# Patient Record
Sex: MALE | Race: WHITE | NOT HISPANIC OR LATINO | Employment: STUDENT | URBAN - METROPOLITAN AREA
[De-identification: names, ages, dates, MRNs, and addresses within clinical notes are randomized per-mention and may not be internally consistent; named-entity substitution may affect disease eponyms.]

---

## 2017-06-22 ENCOUNTER — ALLSCRIPTS OFFICE VISIT (OUTPATIENT)
Dept: OTHER | Facility: OTHER | Age: 22
End: 2017-06-22

## 2017-06-22 DIAGNOSIS — F41.9 ANXIETY DISORDER: ICD-10-CM

## 2017-07-25 ENCOUNTER — ALLSCRIPTS OFFICE VISIT (OUTPATIENT)
Dept: OTHER | Facility: OTHER | Age: 22
End: 2017-07-25

## 2017-08-03 ENCOUNTER — TRANSCRIBE ORDERS (OUTPATIENT)
Dept: ADMINISTRATIVE | Facility: HOSPITAL | Age: 22
End: 2017-08-03

## 2017-08-03 DIAGNOSIS — R51.9 CHRONIC NONINTRACTABLE HEADACHE, UNSPECIFIED HEADACHE TYPE: Primary | ICD-10-CM

## 2017-08-03 DIAGNOSIS — G89.29 CHRONIC NONINTRACTABLE HEADACHE, UNSPECIFIED HEADACHE TYPE: Primary | ICD-10-CM

## 2017-11-21 ENCOUNTER — GENERIC CONVERSION - ENCOUNTER (OUTPATIENT)
Dept: OTHER | Facility: OTHER | Age: 22
End: 2017-11-21

## 2017-12-21 ENCOUNTER — ALLSCRIPTS OFFICE VISIT (OUTPATIENT)
Dept: OTHER | Facility: OTHER | Age: 22
End: 2017-12-21

## 2017-12-21 DIAGNOSIS — N45.1 EPIDIDYMITIS: ICD-10-CM

## 2017-12-22 ENCOUNTER — HOSPITAL ENCOUNTER (OUTPATIENT)
Dept: ULTRASOUND IMAGING | Facility: HOSPITAL | Age: 22
Discharge: HOME | End: 2017-12-22
Payer: COMMERCIAL

## 2017-12-22 DIAGNOSIS — N45.1 EPIDIDYMITIS: ICD-10-CM

## 2017-12-22 PROCEDURE — 76870 US EXAM SCROTUM: CPT

## 2018-01-08 ENCOUNTER — ALLSCRIPTS OFFICE VISIT (OUTPATIENT)
Dept: OTHER | Facility: OTHER | Age: 23
End: 2018-01-08

## 2018-01-08 LAB
BILIRUB UR QL STRIP: NEGATIVE
CLARITY UR: NORMAL
COLOR UR: YELLOW
GLUCOSE (HISTORICAL): NEGATIVE
HGB UR QL STRIP.AUTO: NEGATIVE
KETONES UR STRIP-MCNC: NEGATIVE MG/DL
LEUKOCYTE ESTERASE UR QL STRIP: NEGATIVE
NITRITE UR QL STRIP: NEGATIVE
PH UR STRIP.AUTO: 5.5 [PH]
PROT UR STRIP-MCNC: NEGATIVE MG/DL
SP GR UR STRIP.AUTO: 1.02
UROBILINOGEN UR QL STRIP.AUTO: 0.2

## 2018-01-09 ENCOUNTER — ALLSCRIPTS OFFICE VISIT (OUTPATIENT)
Dept: OTHER | Facility: OTHER | Age: 23
End: 2018-01-09

## 2018-01-13 VITALS
SYSTOLIC BLOOD PRESSURE: 120 MMHG | BODY MASS INDEX: 24.59 KG/M2 | HEIGHT: 69 IN | WEIGHT: 166 LBS | TEMPERATURE: 98.8 F | DIASTOLIC BLOOD PRESSURE: 80 MMHG

## 2018-01-14 VITALS
TEMPERATURE: 98.1 F | HEIGHT: 69 IN | BODY MASS INDEX: 25.33 KG/M2 | WEIGHT: 171 LBS | SYSTOLIC BLOOD PRESSURE: 120 MMHG | DIASTOLIC BLOOD PRESSURE: 80 MMHG

## 2018-01-15 ENCOUNTER — GENERIC CONVERSION - ENCOUNTER (OUTPATIENT)
Dept: OTHER | Facility: OTHER | Age: 23
End: 2018-01-15

## 2018-01-15 ENCOUNTER — HOSPITAL ENCOUNTER (OUTPATIENT)
Dept: MRI IMAGING | Facility: HOSPITAL | Age: 23
Discharge: HOME | End: 2018-01-15
Payer: COMMERCIAL

## 2018-01-15 DIAGNOSIS — R51.9 CHRONIC NONINTRACTABLE HEADACHE, UNSPECIFIED HEADACHE TYPE: ICD-10-CM

## 2018-01-15 DIAGNOSIS — G89.29 CHRONIC NONINTRACTABLE HEADACHE, UNSPECIFIED HEADACHE TYPE: ICD-10-CM

## 2018-01-15 PROCEDURE — 70551 MRI BRAIN STEM W/O DYE: CPT

## 2018-01-16 ENCOUNTER — GENERIC CONVERSION - ENCOUNTER (OUTPATIENT)
Dept: OTHER | Facility: OTHER | Age: 23
End: 2018-01-16

## 2018-01-16 NOTE — PROGRESS NOTES
History of Present Illness  ED Outreach:   ED Visit Information  ED visit date: 11/11/2017  Diagnosis description: RIGHT TESTICULAR PAIN   Facility name: [facility]   Discharge status: Home  Number of ED visits to date: 1  ED severity: 4  Out of network  Outreach Information  Outreach not needed  Date finalized: 11/21/2017  Care Coordination  Emergent necessity not warranted by diagnosis  [facility] PCP  No follow up appointment with PCP  [place] of [place]  Comments:   PT is away at school/ out of state        Signatures   Electronically signed by : Chela Rojas, ; Nov 21 2017 10:06AM EST                       (Author)

## 2018-01-22 VITALS
HEIGHT: 68 IN | SYSTOLIC BLOOD PRESSURE: 110 MMHG | WEIGHT: 191 LBS | BODY MASS INDEX: 28.95 KG/M2 | DIASTOLIC BLOOD PRESSURE: 70 MMHG

## 2018-01-22 VITALS
SYSTOLIC BLOOD PRESSURE: 122 MMHG | DIASTOLIC BLOOD PRESSURE: 80 MMHG | HEIGHT: 69 IN | BODY MASS INDEX: 29.18 KG/M2 | WEIGHT: 197 LBS

## 2018-01-23 VITALS
SYSTOLIC BLOOD PRESSURE: 122 MMHG | BODY MASS INDEX: 28.73 KG/M2 | DIASTOLIC BLOOD PRESSURE: 78 MMHG | WEIGHT: 194 LBS | HEIGHT: 69 IN | TEMPERATURE: 96.6 F

## 2018-01-23 NOTE — RESULT NOTES
Discussion/Summary   MRI looks ok     Verified Results  * MRI BRAIN WO CONTRAST [id number] 06:09PM Yolie Platt     Test Name Result Flag Reference   MRI BRAIN WO CONTRAST (Report)     MRI BRAIN WITHOUT CONTRAST     INDICATION: 72-year-old male, right-sided headaches     COMPARISON:  None  TECHNIQUE: Sagittal T1, axial T2, axial FLAIR, axial T1, axial [facility] [place] and axial diffusion imaging  IMAGE QUALITY: Diagnostic  FINDINGS:     BRAIN PARENCHYMA: There is no discrete mass, mass effect or midline shift  No abnormal white matter signal identified  Brainstem and cerebellum demonstrate normal signal  There is no intracranial hemorrhage  There is no evidence of acute infarction and   diffusion imaging is unremarkable  VENTRICLES: The ventricles are normal in size and contour  SELLA AND PITUITARY GLAND: Normal      ORBITS: Normal      PARANASAL SINUSES: Normal      VASCULATURE: Evaluation of the major intracranial vasculature demonstrates appropriate flow voids       CALVARIUM AND SKULL BASE: Normal      EXTRACRANIAL SOFT TISSUES: Normal        IMPRESSION:     Normal examination       Workstation performed: [id number]     Signed by:   Cullen Martinez MD   1/16/18

## 2018-03-15 ENCOUNTER — OFFICE VISIT (OUTPATIENT)
Dept: FAMILY MEDICINE CLINIC | Facility: CLINIC | Age: 23
End: 2018-03-15
Payer: COMMERCIAL

## 2018-03-15 VITALS
TEMPERATURE: 98.3 F | HEART RATE: 113 BPM | BODY MASS INDEX: 30.36 KG/M2 | SYSTOLIC BLOOD PRESSURE: 128 MMHG | HEIGHT: 69 IN | WEIGHT: 205 LBS | DIASTOLIC BLOOD PRESSURE: 84 MMHG

## 2018-03-15 DIAGNOSIS — F41.1 GENERALIZED ANXIETY DISORDER: Primary | ICD-10-CM

## 2018-03-15 PROBLEM — K50.90 CROHN DISEASE (HCC): Status: ACTIVE | Noted: 2017-06-22

## 2018-03-15 PROCEDURE — 99213 OFFICE O/P EST LOW 20 MIN: CPT | Performed by: FAMILY MEDICINE

## 2018-03-15 RX ORDER — FLUOXETINE HYDROCHLORIDE 40 MG/1
40 CAPSULE ORAL DAILY
Qty: 90 CAPSULE | Refills: 1 | Status: SHIPPED | OUTPATIENT
Start: 2018-03-15 | End: 2018-05-22

## 2018-03-15 RX ORDER — ALPRAZOLAM 0.5 MG/1
0.5 TABLET ORAL EVERY 8 HOURS PRN
Qty: 90 TABLET | Refills: 1 | Status: SHIPPED | OUTPATIENT
Start: 2018-03-15 | End: 2018-03-15 | Stop reason: SDUPTHER

## 2018-03-15 RX ORDER — ALPRAZOLAM 0.5 MG/1
0.5 TABLET ORAL EVERY 8 HOURS PRN
Qty: 90 TABLET | Refills: 0 | Status: SHIPPED | OUTPATIENT
Start: 2018-03-15 | End: 2018-05-22 | Stop reason: SDUPTHER

## 2018-03-15 RX ORDER — DIPHENOXYLATE HYDROCHLORIDE AND ATROPINE SULFATE 2.5; .025 MG/1; MG/1
1 TABLET ORAL DAILY
COMMUNITY

## 2018-03-15 RX ORDER — ALPRAZOLAM 0.5 MG/1
0.5 TABLET ORAL EVERY 8 HOURS PRN
Qty: 90 TABLET | Refills: 0 | Status: SHIPPED | OUTPATIENT
Start: 2018-03-15 | End: 2018-03-15 | Stop reason: SDUPTHER

## 2018-03-15 RX ORDER — ASCORBIC ACID 500 MG
500 TABLET ORAL DAILY
COMMUNITY
End: 2018-10-05

## 2018-03-15 NOTE — PROGRESS NOTES
Assessment/Plan:    Recommend continuing exercise  Side effect profile medication discussed  Recommend recheck in 3 months  Sooner if needed  He will call if any worsening or new symptoms in the interim  No problem-specific Assessment & Plan notes found for this encounter  Diagnoses and all orders for this visit:    Generalized anxiety disorder    Other orders  -     ascorbic acid (VITAMIN C) 500 mg tablet; Take 500 mg by mouth daily  -     multivitamin (THERAGRAN) TABS; Take 1 tablet by mouth daily          Subjective:      Patient ID: Allie Perdomo is a 26 y o  male  Patient here for increased anxiety over the last 1-2 months  He was off of fluoxetine and takes alprazolam on a very as needed basis  He notes increased stressors  He has a few panic attack symptoms  He notes that he has no anhedonia  Sleep disturbance is not present  The following portions of the patient's history were reviewed and updated as appropriate: allergies, current medications, past family history, past medical history, past social history, past surgical history and problem list     Review of Systems   Constitutional: Negative  HENT: Negative  Eyes: Negative  Respiratory: Negative  Cardiovascular: Negative  Gastrointestinal: Negative  Endocrine: Negative  Genitourinary: Negative  Musculoskeletal: Negative  Skin: Negative  Allergic/Immunologic: Negative  Neurological: Negative  Hematological: Negative  Psychiatric/Behavioral: Positive for decreased concentration  Negative for agitation, behavioral problems, confusion, dysphoric mood, hallucinations, self-injury, sleep disturbance and suicidal ideas  The patient is nervous/anxious  The patient is not hyperactive            Objective:      /84 (BP Location: Left arm, Patient Position: Sitting, Cuff Size: Adult)   Pulse (!) 113   Temp 98 3 °F (36 8 °C) (Tympanic)   Ht 5' 8 5" (1 74 m)   Wt 93 kg (205 lb)   BMI 30 72 kg/m²          Physical Exam   Constitutional: He is oriented to person, place, and time  He appears well-developed and well-nourished  HENT:   Head: Normocephalic and atraumatic  Right Ear: External ear normal  Tympanic membrane is not erythematous and not bulging  Left Ear: External ear normal  Tympanic membrane is not erythematous and not bulging  Nose: Nose normal    Mouth/Throat: Oropharynx is clear and moist and mucous membranes are normal  No oral lesions  No oropharyngeal exudate  Eyes: Conjunctivae and EOM are normal  Right eye exhibits no discharge  Left eye exhibits no discharge  No scleral icterus  Neck: Normal range of motion  Neck supple  No thyromegaly present  Cardiovascular: Normal rate, regular rhythm and normal heart sounds  Exam reveals no gallop and no friction rub  No murmur heard  Pulmonary/Chest: Effort normal  No respiratory distress  He has no wheezes  He has no rales  He exhibits no tenderness  Abdominal: Soft  Bowel sounds are normal  He exhibits no distension and no mass  There is no tenderness  There is no rebound and no guarding  Musculoskeletal: Normal range of motion  He exhibits no edema, tenderness or deformity  Lymphadenopathy:     He has no cervical adenopathy  Neurological: He is alert and oriented to person, place, and time  He has normal reflexes  No cranial nerve deficit  He exhibits normal muscle tone  Coordination normal    Skin: Skin is warm and dry  No rash noted  No erythema  No pallor  Psychiatric: He has a normal mood and affect  His behavior is normal    Vitals reviewed

## 2018-03-16 ENCOUNTER — OFFICE VISIT (OUTPATIENT)
Dept: UROLOGY | Facility: MEDICAL CENTER | Age: 23
End: 2018-03-16
Payer: COMMERCIAL

## 2018-03-16 VITALS
BODY MASS INDEX: 31.07 KG/M2 | DIASTOLIC BLOOD PRESSURE: 70 MMHG | WEIGHT: 205 LBS | HEIGHT: 68 IN | SYSTOLIC BLOOD PRESSURE: 120 MMHG

## 2018-03-16 DIAGNOSIS — N43.40 SPERMATOCELE: ICD-10-CM

## 2018-03-16 DIAGNOSIS — N45.1 EPIDIDYMITIS: Primary | ICD-10-CM

## 2018-03-16 LAB
SL AMB  POCT GLUCOSE, UA: NORMAL
SL AMB LEUKOCYTE ESTERASE,UA: NORMAL
SL AMB POCT BILIRUBIN,UA: NORMAL
SL AMB POCT BLOOD,UA: NORMAL
SL AMB POCT CLARITY,UA: CLEAR
SL AMB POCT COLOR,UA: YELLOW
SL AMB POCT KETONES,UA: NORMAL
SL AMB POCT NITRITE,UA: NORMAL
SL AMB POCT PH,UA: 5.5
SL AMB POCT SPECIFIC GRAVITY,UA: 1.01
SL AMB POCT URINE PROTEIN: NORMAL
SL AMB POCT UROBILINOGEN: 0.2

## 2018-03-16 PROCEDURE — 81003 URINALYSIS AUTO W/O SCOPE: CPT | Performed by: NURSE PRACTITIONER

## 2018-03-16 PROCEDURE — 99214 OFFICE O/P EST MOD 30 MIN: CPT | Performed by: NURSE PRACTITIONER

## 2018-03-16 NOTE — PROGRESS NOTES
3/16/2018    Shiela Stable  1995  [id number]        Assessment  Right spermatocele  Right epididymal cyst    Plan  Reassured  Repeat scrotal ultrasound in December at the [facility] for comparison  Follow-up office visit 9 months after ultrasound      Ayad Burrows is a 26 y o  male being managed by Dr Lynette Edwards  Patient was diagnosed with a right spermatocele and more recently a right epididymal cyst   This has been confirmed by ultrasound  Patient just a bit anxious about this and concerned he can turn into cancer  Reassured  No changes since last visit  Will repeat ultrasound study at [facility] for comparison with a follow-up office visit  Patient is comfortable with this  History of Present Illness  26 y o  male presents today for right spermatocele follow up  Denies any changes  Offers no complaints  Review of Systems  Review of Systems - History obtained from chart review and the patient  General ROS: negative  Psychological ROS: positive for - anxiety  Respiratory ROS: no cough, shortness of breath, or wheezing  Genito-Urinary ROS: negative          Past Medical History  Past Medical History:   Diagnosis Date    Anxiety     Resolved: 1/9/2018     Clostridium difficile colitis     Last Assessed: 6/22/2017    Cro[facility]'s disease ([facility] [address] )     Epididymitis 12/21/2017    Resolved: 1/9/2018     Spermatocele        Past Social History  Past Surgical History:   Procedure Laterality Date    BOWEL RESECTION  2011    SMALL INTESTINE SURGERY      Partial- Last Assessed: 6/22/2017     SMALL INTESTINE SURGERY         Past Family History  Family History   Problem Relation Age of Onset    Anxiety disorder Mother     Crohn's disease Family     Ulcerative colitis Family        Past Social history  Social History     Social History    Marital status: Single     Spouse name: N/A    Number of children: N/A    Years of education: N/A     Occupational History    Not on file       Social History Main Topics    Smoking status: Never Smoker    Smokeless tobacco: Never Used    Alcohol use Yes      Comment: Social Alcohol Use     Drug use: Yes     Types: Marijuana    Sexual activity: Not on file     Other Topics Concern    Not on file     Social History Narrative    No narrative on file       Current Medications  Current Outpatient Prescriptions   Medication Sig Dispense Refill    adalimumab (HUMIRA) 40 mg/0 8 mL PSKT Inject 40 mg under the skin once a week        ALPRAZolam (XANAX) 0 5 mg tablet Take 1 tablet (0 5 mg total) by mouth every 8 (eight) hours as needed for anxiety 90 tablet 0    ascorbic acid (VITAMIN C) 500 mg tablet Take 500 mg by mouth daily      Cholecalciferol (VITAMIN D) 2000 units CAPS Take by mouth      FLUoxetine (PROzac) 40 MG capsule Take 1 capsule (40 mg total) by mouth daily 90 capsule 1    folic acid (FOLVITE) 1 mg tablet Take by mouth      methotrexate 2 5 mg tablet Take by mouth once a week      multivitamin (THERAGRAN) TABS Take 1 tablet by mouth daily      PROBIOTIC PRODUCT PO Take by mouth       No current facility-administered medications for this visit  Allergies  Allergies   Allergen Reactions    Penicillins Hives and Rash     Annotation - [id number]: serum reaction       Past Medical History, Social History, Family History, medications and allergies were reviewed  Vitals  Vitals:    03/16/18 1415   BP: 120/70   Weight: 93 kg (205 lb)   Height: 5' 8" (1 727 m)       Physical Exam  Skin: warm, dry, intact  Cardiac: S1S2, HRR, Peripheral edema: negative  Pulmonary: Non-labored breathing  Abdomen: Soft, non-tender, non-distended  No surgical scars  No masses, tenderness, hernias noted  Musculoskeletal: AROM with no joint deformity or tenderness    Neurology: alert, oriented x3, affect appropriate, no focal neurological deficits, moves all extremities well, no involuntary movements and reflexes at knee and ankle intact  Genitourinary: Negative CVA tenderness, negative suprapubic tenderness  (Male): Penis circumcised, phallus normal, meatus patent  Testicles descended into scrotum bilaterally notable right spermatocele and right epididymal cyst left side normal   No inguinal hernias bilaterally          Results  No results found for: PSA  No results found for: GLUCOSE, CALCIUM, NA, K, CO2, CL, BUN, CREATININE  No results found for: WBC, HGB, HCT, MCV, PLT

## 2018-05-22 ENCOUNTER — OFFICE VISIT (OUTPATIENT)
Dept: FAMILY MEDICINE CLINIC | Facility: CLINIC | Age: 23
End: 2018-05-22
Payer: COMMERCIAL

## 2018-05-22 ENCOUNTER — TELEPHONE (OUTPATIENT)
Dept: FAMILY MEDICINE CLINIC | Facility: CLINIC | Age: 23
End: 2018-05-22

## 2018-05-22 VITALS
DIASTOLIC BLOOD PRESSURE: 74 MMHG | TEMPERATURE: 97.5 F | HEIGHT: 69 IN | WEIGHT: 204 LBS | SYSTOLIC BLOOD PRESSURE: 118 MMHG | BODY MASS INDEX: 30.21 KG/M2

## 2018-05-22 DIAGNOSIS — F41.1 GENERALIZED ANXIETY DISORDER: ICD-10-CM

## 2018-05-22 DIAGNOSIS — K50.919 CROHN'S DISEASE WITH COMPLICATION, UNSPECIFIED GASTROINTESTINAL TRACT LOCATION (HCC): ICD-10-CM

## 2018-05-22 DIAGNOSIS — I88.9 LYMPHADENITIS: Primary | ICD-10-CM

## 2018-05-22 PROCEDURE — 99213 OFFICE O/P EST LOW 20 MIN: CPT | Performed by: FAMILY MEDICINE

## 2018-05-22 RX ORDER — ALPRAZOLAM 0.5 MG/1
0.5 TABLET ORAL EVERY 8 HOURS PRN
Qty: 90 TABLET | Refills: 0 | Status: SHIPPED | OUTPATIENT
Start: 2018-05-22 | End: 2018-07-30 | Stop reason: SDUPTHER

## 2018-05-22 NOTE — TELEPHONE ENCOUNTER
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

## 2018-05-22 NOTE — PROGRESS NOTES
Assessment/Plan:  No significant findings on physical exam today  Recommend ultrasound if lymph node discomfort persists  He is on Humira  Consider blood testing as well  He will call if any new symptoms develop or current symptoms persist or worsen  No problem-specific Assessment & Plan notes found for this encounter  Diagnoses and all orders for this visit:    Lymphadenitis  -     US head neck soft tissue; Future    Generalized anxiety disorder  -     ALPRAZolam (XANAX) 0 5 mg tablet; Take 1 tablet (0 5 mg total) by mouth every 8 (eight) hours as needed for anxiety    Other orders  -     Omega-3 Fatty Acids (FISH OIL PO); Take 2 capsules by mouth          Subjective:      Patient ID: Mell Addison is a 26 y o  male  HPI    The following portions of the patient's history were reviewed and updated as appropriate: allergies, current medications, past family history, past medical history, past social history, past surgical history and problem list     Review of Systems   Constitutional: Negative  Negative for fever  HENT: Negative  Eyes: Negative  Respiratory: Negative  Cardiovascular: Negative  Gastrointestinal: Negative  Endocrine: Negative  Genitourinary: Negative  Musculoskeletal: Negative  Skin: Negative  Allergic/Immunologic: Negative  Neurological: Negative  Hematological: Positive for adenopathy (Mildly tender cervical anterior lymph nodes bilaterally)  Psychiatric/Behavioral: Negative  Objective:      /74   Temp 97 5 °F (36 4 °C)   Ht 5' 9" (1 753 m)   Wt 92 5 kg (204 lb)   BMI 30 13 kg/m²          Physical Exam   Constitutional: He is oriented to person, place, and time  He appears well-developed and well-nourished  HENT:   Head: Normocephalic and atraumatic  Right Ear: External ear normal  Tympanic membrane is not erythematous and not bulging  Left Ear: External ear normal  Tympanic membrane is not erythematous and not bulging  Nose: Nose normal    Mouth/Throat: Oropharynx is clear and moist and mucous membranes are normal  No oral lesions  No oropharyngeal exudate  Eyes: Conjunctivae and EOM are normal  Right eye exhibits no discharge  Left eye exhibits no discharge  No scleral icterus  Neck: Normal range of motion  Neck supple  No thyromegaly present  Cardiovascular: Normal rate, regular rhythm and normal heart sounds  Exam reveals no gallop and no friction rub  No murmur heard  Pulmonary/Chest: Effort normal  No respiratory distress  He has no wheezes  He has no rales  He exhibits no tenderness  Abdominal: Soft  Bowel sounds are normal  He exhibits no distension and no mass  There is no tenderness  There is no rebound and no guarding  Musculoskeletal: Normal range of motion  He exhibits no edema, tenderness or deformity  Lymphadenopathy:     He has no cervical adenopathy  Neurological: He is alert and oriented to person, place, and time  He has normal reflexes  No cranial nerve deficit  He exhibits normal muscle tone  Coordination normal    Skin: Skin is warm and dry  No rash noted  No erythema  No pallor  Psychiatric: He has a normal mood and affect  His behavior is normal    Vitals reviewed

## 2018-05-23 DIAGNOSIS — Z11.1 TUBERCULOSIS SCREENING: Primary | ICD-10-CM

## 2018-05-23 NOTE — TELEPHONE ENCOUNTER
Order placed for QuantiFERON gold testing  I do not recommend varicella vaccination considering that he is on Humira  He should not have any live vaccines done

## 2018-06-01 ENCOUNTER — OFFICE VISIT (OUTPATIENT)
Dept: FAMILY MEDICINE CLINIC | Facility: CLINIC | Age: 23
End: 2018-06-01
Payer: COMMERCIAL

## 2018-06-01 ENCOUNTER — HOSPITAL ENCOUNTER (OUTPATIENT)
Dept: RADIOLOGY | Facility: HOSPITAL | Age: 23
Discharge: HOME | End: 2018-06-01
Payer: COMMERCIAL

## 2018-06-01 VITALS
DIASTOLIC BLOOD PRESSURE: 72 MMHG | WEIGHT: 206 LBS | SYSTOLIC BLOOD PRESSURE: 104 MMHG | HEIGHT: 69 IN | BODY MASS INDEX: 30.51 KG/M2 | TEMPERATURE: 96.9 F | HEART RATE: 68 BPM

## 2018-06-01 DIAGNOSIS — F41.1 GENERALIZED ANXIETY DISORDER: ICD-10-CM

## 2018-06-01 DIAGNOSIS — R94.31 ABNORMAL EKG: ICD-10-CM

## 2018-06-01 DIAGNOSIS — K50.919 CROHN'S DISEASE WITH COMPLICATION, UNSPECIFIED GASTROINTESTINAL TRACT LOCATION (HCC): ICD-10-CM

## 2018-06-01 DIAGNOSIS — R07.89 ATYPICAL CHEST PAIN: ICD-10-CM

## 2018-06-01 DIAGNOSIS — R07.89 ATYPICAL CHEST PAIN: Primary | ICD-10-CM

## 2018-06-01 PROCEDURE — 99214 OFFICE O/P EST MOD 30 MIN: CPT | Performed by: FAMILY MEDICINE

## 2018-06-01 PROCEDURE — 71046 X-RAY EXAM CHEST 2 VIEWS: CPT

## 2018-06-01 PROCEDURE — 93000 ELECTROCARDIOGRAM COMPLETE: CPT | Performed by: FAMILY MEDICINE

## 2018-06-01 NOTE — PROGRESS NOTES
Assessment/Plan:    Patient with no significant findings on physical exam   EKG done in the office shows normal sinus rhythm but he does have slight ST segment elevation which may be secondary to repolarization but considering symptoms I am going to recommend cardiology evaluation and chest x-ray  Card given for [address] [place] [address] Cardiology  Recommend ER evaluation if symptoms worsen in frequency or intensity  If new symptoms develop recommend that he call the office  No problem-specific Assessment & Plan notes found for this encounter  Diagnoses and all orders for this visit:    Atypical chest pain          Subjective:      Patient ID: Whit Salazar is a 26 y o  male  Patient here with intermittent twinges of chest pain to the chest in over the last 2-3 months  Symptoms last for few seconds  They are not exertional   They are not positional   Denies any shortness of breath with the symptoms  He describes the pain as a stabbing pain  He denies any palpitations  No onset of symptoms during the evening hours  Denies any edema  He does have history of mild-to-moderate anxiety and history of Crohn's disease with treatment of Humira  The following portions of the patient's history were reviewed and updated as appropriate: allergies, current medications, past family history, past medical history, past social history, past surgical history and problem list     Review of Systems   Constitutional: Positive for fever  HENT: Negative  Eyes: Negative  Respiratory: Negative for chest tightness and shortness of breath  Cardiovascular: Positive for chest pain  Gastrointestinal: Negative  Endocrine: Negative  Genitourinary: Negative  Musculoskeletal: Negative  Skin: Negative  Allergic/Immunologic: Negative  Neurological: Negative  Hematological: Negative  Psychiatric/Behavioral: Negative            Objective:      /72 (BP Location: Left arm, Patient Position: Sitting, Cuff Size: Large)   Pulse 68   Temp (!) 96 9 °F (36 1 °C) (Temporal)   Ht 5' 9" (1 753 m)   Wt 93 4 kg (206 lb)   BMI 30 42 kg/m²          Physical Exam   Constitutional: He is oriented to person, place, and time  He appears well-developed and well-nourished  HENT:   Head: Normocephalic and atraumatic  Right Ear: External ear normal  Tympanic membrane is not erythematous and not bulging  Left Ear: External ear normal  Tympanic membrane is not erythematous and not bulging  Nose: Nose normal    Mouth/Throat: Oropharynx is clear and moist and mucous membranes are normal  No oral lesions  No oropharyngeal exudate  Eyes: Conjunctivae and EOM are normal  Right eye exhibits no discharge  Left eye exhibits no discharge  No scleral icterus  Neck: Normal range of motion  Neck supple  No thyromegaly present  Cardiovascular: Normal rate, regular rhythm and normal heart sounds  Exam reveals no gallop and no friction rub  No murmur heard  Pulmonary/Chest: Effort normal  No respiratory distress  He has no wheezes  He has no rales  He exhibits no tenderness  Abdominal: Soft  Bowel sounds are normal  He exhibits no distension and no mass  There is no tenderness  There is no rebound and no guarding  Musculoskeletal: Normal range of motion  He exhibits no edema, tenderness or deformity  Lymphadenopathy:     He has no cervical adenopathy  Neurological: He is alert and oriented to person, place, and time  He has normal reflexes  No cranial nerve deficit  He exhibits normal muscle tone  Coordination normal    Skin: Skin is warm and dry  No rash noted  No erythema  No pallor  Psychiatric: He has a normal mood and affect  His behavior is normal    Vitals reviewed

## 2018-06-08 ENCOUNTER — OFFICE VISIT (OUTPATIENT)
Dept: CARDIOLOGY CLINIC | Facility: CLINIC | Age: 23
End: 2018-06-08
Payer: COMMERCIAL

## 2018-06-08 VITALS
DIASTOLIC BLOOD PRESSURE: 74 MMHG | SYSTOLIC BLOOD PRESSURE: 128 MMHG | HEART RATE: 62 BPM | HEIGHT: 69 IN | WEIGHT: 207 LBS | BODY MASS INDEX: 30.66 KG/M2

## 2018-06-08 DIAGNOSIS — R94.31 ABNORMAL EKG: ICD-10-CM

## 2018-06-08 DIAGNOSIS — R07.89 ATYPICAL CHEST PAIN: ICD-10-CM

## 2018-06-08 PROCEDURE — 93000 ELECTROCARDIOGRAM COMPLETE: CPT | Performed by: INTERNAL MEDICINE

## 2018-06-08 PROCEDURE — 99203 OFFICE O/P NEW LOW 30 MIN: CPT | Performed by: INTERNAL MEDICINE

## 2018-06-08 NOTE — PROGRESS NOTES
[address] Cardiology [place]  [address]  [facility] [facility]  [phone]    Cardiology New Patient     Patient:  Mell Addison  :  1995  MRN:  [id number]    History of Present Illness:     82-year-old man with past medical history of Crohn's disease status post resection 7 years ago presents as a new patient cardiology evaluation for chest discomfort  He notes the chest discomfort is sharp and stabbing and lasts just a few seconds and can occur a few times per day and then he cannot have the pain for week at a time  It is nonexertional and does radiate to his back  He notes no palpitations and notes no syncope  He notes that he does get dizzy if he gets anxious  Patient Active Problem List   Diagnosis    Crohn disease ([facility] [address] )    Generalized anxiety disorder    Vitamin D deficiency       Past Surgical History  Past Surgical History:   Procedure Laterality Date    BOWEL RESECTION      SMALL INTESTINE SURGERY      Partial- Last Assessed: 2017     SMALL INTESTINE SURGERY         Social History   Social History     Social History    Marital status: Single     Spouse name: N/A    Number of children: N/A    Years of education: N/A     Occupational History    Not on file  Social History Main Topics    Smoking status: Former Smoker    Smokeless tobacco: Never Used    Alcohol use Yes      Comment: Social Alcohol Use     Drug use: Yes     Types: Marijuana    Sexual activity: Yes     Other Topics Concern    Not on file     Social History Narrative    No narrative on file        Allergies   Allergen Reactions    Penicillins Hives and Rash     Annotation - [id number]: serum reaction       Family History   Family History   Problem Relation Age of Onset    Anxiety disorder Mother     Crohn's disease Family     Ulcerative colitis Family        Review of Systems:  Review of Systems   Constitutional: Negative for chills, fatigue and fever     HENT: Negative for hearing loss and trouble swallowing  Eyes: Negative for pain  Respiratory: Negative for cough, chest tightness and shortness of breath  Cardiovascular: Negative for chest pain, palpitations and leg swelling  Gastrointestinal: Negative for abdominal pain, blood in stool, nausea and vomiting  Endocrine: Negative for cold intolerance and heat intolerance  Genitourinary: Negative for difficulty urinating, frequency and hematuria  Musculoskeletal: Negative for arthralgias and neck pain  Skin: Negative for rash  Allergic/Immunologic: Negative for environmental allergies  Neurological: Negative for dizziness, weakness and headaches  Hematological: Does not bruise/bleed easily  Psychiatric/Behavioral: Negative for decreased concentration and sleep disturbance  The patient is nervous/anxious  Current Outpatient Prescriptions:     adalimumab (HUMIRA) 40 mg/0 8 mL PSKT, Inject 40 mg under the skin once a week  , Disp: , Rfl:     ALPRAZolam (XANAX) 0 5 mg tablet, Take 1 tablet (0 5 mg total) by mouth every 8 (eight) hours as needed for anxiety, Disp: 90 tablet, Rfl: 0    ascorbic acid (VITAMIN C) 500 mg tablet, Take 500 mg by mouth daily, Disp: , Rfl:     multivitamin (THERAGRAN) TABS, Take 1 tablet by mouth daily, Disp: , Rfl:     Omega-3 Fatty Acids (FISH OIL PO), Take 2 capsules by mouth, Disp: , Rfl:      Physical Exam:    Vitals:    06/08/18 1355   BP: 128/74   Pulse: 62   Weight: 93 9 kg (207 lb)   Height: 5' 9" (1 753 m)       Physical Exam   Constitutional: He is oriented to person, place, and time  He appears well-developed and well-nourished  HENT:   Head: Normocephalic  Right Ear: External ear normal    Left Ear: External ear normal    Mouth/Throat: Oropharynx is clear and moist    Eyes: Pupils are equal, round, and reactive to light  Neck: No JVD present  Carotid bruit is not present  Cardiovascular: Normal rate, regular rhythm and intact distal pulses    Exam reveals no gallop and no friction rub  No murmur heard  Pulmonary/Chest: Effort normal and breath sounds normal  No tachypnea  No respiratory distress  He has no wheezes  He has no rales  He exhibits no tenderness  Abdominal: Soft  He exhibits no distension  There is no tenderness  There is no rebound and no guarding  Musculoskeletal: He exhibits no edema  Neurological: He is alert and oriented to person, place, and time  Skin: Skin is warm and dry  Psychiatric: He has a normal mood and affect  His behavior is normal  Judgment and thought content normal    Nursing note and vitals reviewed  Labs:not applicable    Assessment/Plan:    1  Chest discomfort-this is noncardiac chest discomfort needs no further cardiovascular testing at this time  2   Electrocardiogram done at his primary doctor's office which shows probable brief atrial tachycardia versus prominent sinus arrhythmia  -he is asymptomatic  We discussed ways to reduce stress  We can certainly see him back as needed in the future  Thank you so much, please do not hesitate to contact me with any questions or concerns        Harjeet Rain MD  6/8/2018  2:20 PM

## 2018-06-08 NOTE — LETTER
2018     Erika Alfonso, [address] [facility],[address]  [address] [place] [address]    Patient: Delaney Strickland   YOB: 1995   Date of Visit: 2018       Dear Dr Jimenez Payor: Thank you for referring Delaney Strickland to me for evaluation  Below are my notes for this consultation  If you have questions, please do not hesitate to call me  I look forward to following your patient along with you  Sincerely,        Artemio Zhang MD        CC: No Recipients  Artemio Zhang MD  2018  2:45 PM  Sign at close encounter  [address] [facility] [place]  [address] [facility]  [phone]    Cardiology New Patient     Patient:  Delaney Strickland  :  1995  MRN:  [id number]    History of Present Illness:     82-year-old man with past medical history of Crohn's disease status post resection 7 years ago presents as a new patient cardiology evaluation for chest discomfort  He notes the chest discomfort is sharp and stabbing and lasts just a few seconds and can occur a few times per day and then he cannot have the pain for week at a time  It is nonexertional and does radiate to his back  He notes no palpitations and notes no syncope  He notes that he does get dizzy if he gets anxious  Patient Active Problem List   Diagnosis    Crohn disease ([facility] [address] )    Generalized anxiety disorder    Vitamin D deficiency       Past Surgical History  Past Surgical History:   Procedure Laterality Date    BOWEL RESECTION      SMALL INTESTINE SURGERY      Partial- Last Assessed: 2017     SMALL INTESTINE SURGERY         Social History   Social History     Social History    Marital status: Single     Spouse name: N/A    Number of children: N/A    Years of education: N/A     Occupational History    Not on file  Social History Main Topics    Smoking status: Former Smoker    Smokeless tobacco: Never Used    Alcohol use Yes      Comment: Social Alcohol Use     Drug use:  Yes Types: Marijuana    Sexual activity: Yes     Other Topics Concern    Not on file     Social History Narrative    No narrative on file        Allergies   Allergen Reactions    Penicillins Hives and Rash     Annotation - [id number]: serum reaction       Family History   Family History   Problem Relation Age of Onset    Anxiety disorder Mother     Crohn's disease Family     Ulcerative colitis Family        Review of Systems:  Review of Systems   Constitutional: Negative for chills, fatigue and fever  HENT: Negative for hearing loss and trouble swallowing  Eyes: Negative for pain  Respiratory: Negative for cough, chest tightness and shortness of breath  Cardiovascular: Negative for chest pain, palpitations and leg swelling  Gastrointestinal: Negative for abdominal pain, blood in stool, nausea and vomiting  Endocrine: Negative for cold intolerance and heat intolerance  Genitourinary: Negative for difficulty urinating, frequency and hematuria  Musculoskeletal: Negative for arthralgias and neck pain  Skin: Negative for rash  Allergic/Immunologic: Negative for environmental allergies  Neurological: Negative for dizziness, weakness and headaches  Hematological: Does not bruise/bleed easily  Psychiatric/Behavioral: Negative for decreased concentration and sleep disturbance  The patient is nervous/anxious            Current Outpatient Prescriptions:     adalimumab (HUMIRA) 40 mg/0 8 mL PSKT, Inject 40 mg under the skin once a week  , Disp: , Rfl:     ALPRAZolam (XANAX) 0 5 mg tablet, Take 1 tablet (0 5 mg total) by mouth every 8 (eight) hours as needed for anxiety, Disp: 90 tablet, Rfl: 0    ascorbic acid (VITAMIN C) 500 mg tablet, Take 500 mg by mouth daily, Disp: , Rfl:     multivitamin (THERAGRAN) TABS, Take 1 tablet by mouth daily, Disp: , Rfl:     Omega-3 Fatty Acids (FISH OIL PO), Take 2 capsules by mouth, Disp: , Rfl:      Physical Exam:    Vitals:    06/08/18 1355   BP: 128/74 Pulse: 62   Weight: 93 9 kg (207 lb)   Height: 5' 9" (1 753 m)       Physical Exam   Constitutional: He is oriented to person, place, and time  He appears well-developed and well-nourished  HENT:   Head: Normocephalic  Right Ear: External ear normal    Left Ear: External ear normal    Mouth/Throat: Oropharynx is clear and moist    Eyes: Pupils are equal, round, and reactive to light  Neck: No JVD present  Carotid bruit is not present  Cardiovascular: Normal rate, regular rhythm and intact distal pulses  Exam reveals no gallop and no friction rub  No murmur heard  Pulmonary/Chest: Effort normal and breath sounds normal  No tachypnea  No respiratory distress  He has no wheezes  He has no rales  He exhibits no tenderness  Abdominal: Soft  He exhibits no distension  There is no tenderness  There is no rebound and no guarding  Musculoskeletal: He exhibits no edema  Neurological: He is alert and oriented to person, place, and time  Skin: Skin is warm and dry  Psychiatric: He has a normal mood and affect  His behavior is normal  Judgment and thought content normal    Nursing note and vitals reviewed  Labs:not applicable    Assessment/Plan:    1  Chest discomfort-this is noncardiac chest discomfort needs no further cardiovascular testing at this time  2   Electrocardiogram done at his primary doctor's office which shows probable brief atrial tachycardia versus prominent sinus arrhythmia  -he is asymptomatic  We discussed ways to reduce stress  We can certainly see him back as needed in the future  Thank you so much, please do not hesitate to contact me with any questions or concerns        Anu Pillai MD  6/8/2018  2:20 PM

## 2018-06-18 ENCOUNTER — TELEPHONE (OUTPATIENT)
Dept: FAMILY MEDICINE CLINIC | Facility: CLINIC | Age: 23
End: 2018-06-18

## 2018-06-18 NOTE — TELEPHONE ENCOUNTER
Call patient and recommend he schedule follow-up appointment with Dr Mara Ojeda to discuss further testing

## 2018-06-18 NOTE — TELEPHONE ENCOUNTER
Pt called stating he was seen by Dr Gray Ingram on 05/22 for swollen lymph nodes, they are still a little swollen since then and he states his current medication for Crohn's, Humira, could possibly cause lymphoma  He wants to know if there is any type of screening or test that could be done to determine if he has this or not?  CB is [phone]

## 2018-06-21 ENCOUNTER — OFFICE VISIT (OUTPATIENT)
Dept: FAMILY MEDICINE CLINIC | Facility: CLINIC | Age: 23
End: 2018-06-21
Payer: COMMERCIAL

## 2018-06-21 VITALS
DIASTOLIC BLOOD PRESSURE: 78 MMHG | BODY MASS INDEX: 30.36 KG/M2 | RESPIRATION RATE: 12 BRPM | WEIGHT: 205 LBS | SYSTOLIC BLOOD PRESSURE: 122 MMHG | TEMPERATURE: 97.5 F | HEIGHT: 69 IN | HEART RATE: 68 BPM

## 2018-06-21 DIAGNOSIS — K50.919 CROHN'S DISEASE WITH COMPLICATION, UNSPECIFIED GASTROINTESTINAL TRACT LOCATION (HCC): Primary | ICD-10-CM

## 2018-06-21 LAB
BASOPHILS # BLD AUTO: 0.03 THOUSANDS/ΜL (ref 0–0.1)
BASOPHILS NFR BLD AUTO: 0 % (ref 0–1)
EOSINOPHIL # BLD AUTO: 0.1 THOUSAND/ΜL (ref 0–0.61)
EOSINOPHIL NFR BLD AUTO: 1 % (ref 0–6)
ERYTHROCYTE [DISTWIDTH] IN BLOOD BY AUTOMATED COUNT: 12.6 % (ref 11.6–15.1)
HCT VFR BLD AUTO: 44 % (ref 36.5–49.3)
HGB BLD-MCNC: 14.3 G/DL (ref 12–17)
IMM GRANULOCYTES # BLD AUTO: 0.01 THOUSAND/UL (ref 0–0.2)
IMM GRANULOCYTES NFR BLD AUTO: 0 % (ref 0–2)
LYMPHOCYTES # BLD AUTO: 2.19 THOUSANDS/ΜL (ref 0.6–4.47)
LYMPHOCYTES NFR BLD AUTO: 31 % (ref 14–44)
MCH RBC QN AUTO: 29.1 PG (ref 26.8–34.3)
MCHC RBC AUTO-ENTMCNC: 32.5 G/DL (ref 31.4–37.4)
MCV RBC AUTO: 89 FL (ref 82–98)
MONOCYTES # BLD AUTO: 0.76 THOUSAND/ΜL (ref 0.17–1.22)
MONOCYTES NFR BLD AUTO: 11 % (ref 4–12)
NEUTROPHILS # BLD AUTO: 4.07 THOUSANDS/ΜL (ref 1.85–7.62)
NEUTS SEG NFR BLD AUTO: 57 % (ref 43–75)
NRBC BLD AUTO-RTO: 0 /100 WBCS
PLATELET # BLD AUTO: 307 THOUSANDS/UL (ref 149–390)
PMV BLD AUTO: 12.2 FL (ref 8.9–12.7)
RBC # BLD AUTO: 4.92 MILLION/UL (ref 3.88–5.62)
WBC # BLD AUTO: 7.16 THOUSAND/UL (ref 4.31–10.16)

## 2018-06-21 PROCEDURE — 99213 OFFICE O/P EST LOW 20 MIN: CPT | Performed by: FAMILY MEDICINE

## 2018-06-21 PROCEDURE — 3008F BODY MASS INDEX DOCD: CPT | Performed by: FAMILY MEDICINE

## 2018-06-21 PROCEDURE — 36415 COLL VENOUS BLD VENIPUNCTURE: CPT | Performed by: FAMILY MEDICINE

## 2018-06-21 PROCEDURE — 85025 COMPLETE CBC W/AUTO DIFF WBC: CPT | Performed by: FAMILY MEDICINE

## 2018-06-21 NOTE — PROGRESS NOTES
Assessment/Plan:    Labs drawn for CBC with differential, will heed results  Recommend patient follow up with Dr Cristal Holstein and with Gastroenterology  Diagnoses and all orders for this visit:    Crohn's disease with complication, unspecified gastrointestinal tract location [facility])  Comments:  CBC with differential, will heed results  Orders:  -     CBC and differential          Subjective:      Patient ID: Yaz Wu is a 26 y o  male  Patient requests being tested for lymphoma as he is on Humira and is aware of an increased risk of lymphoma while taking Humira  Patient states he has had intermittent swollen glands in his neck and previously spoke with Dr Cristal Holstein about this and an ultrasound of the neck was ordered but patient never had the test performed  Patient was diagnosed with Crohn's disease at 12years old and follows with [facility] pediatric gastroenterologist   Patient has otherwise been feeling well overall  Appetite is good, weight is stable and patient exercises regularly  The following portions of the patient's history were reviewed and updated as appropriate: allergies, current medications, past family history, past medical history, past social history, past surgical history and problem list     Review of Systems   Constitutional: Negative for activity change, appetite change, chills, diaphoresis, fatigue, fever and unexpected weight change  HENT: Negative  Eyes: Negative  Respiratory: Negative for cough, chest tightness, shortness of breath and wheezing  Cardiovascular: Positive for chest pain  Negative for palpitations and leg swelling  Gastrointestinal: Negative for abdominal pain, blood in stool, constipation, diarrhea, nausea and vomiting  Endocrine: Negative for cold intolerance and heat intolerance  Genitourinary: Negative  Musculoskeletal: Negative  Neurological: Positive for light-headedness   Negative for dizziness, syncope, numbness and headaches  Hematological: Positive for adenopathy  Does not bruise/bleed easily  Psychiatric/Behavioral: Negative for dysphoric mood  The patient is nervous/anxious  Objective:      /78 (BP Location: Left arm, Patient Position: Sitting, Cuff Size: Large)   Pulse 68   Temp 97 5 °F (36 4 °C) (Tympanic)   Resp 12   Ht 5' 9" (1 753 m)   Wt 93 kg (205 lb)   BMI 30 27 kg/m²          Physical Exam   Constitutional: He is oriented to person, place, and time  He appears well-developed and well-nourished  No distress  HENT:   Head: Normocephalic  Right Ear: External ear normal    Left Ear: External ear normal    Nose: Nose normal    Mouth/Throat: Oropharynx is clear and moist    Eyes: Conjunctivae are normal  No scleral icterus  Neck: Neck supple  Negative axillary nodes  Cardiovascular: Normal rate and regular rhythm  Pulmonary/Chest: Effort normal and breath sounds normal    Abdominal: Soft  There is no tenderness  Musculoskeletal: He exhibits no edema  Lymphadenopathy:     He has no cervical adenopathy  Neurological: He is alert and oriented to person, place, and time  Skin: Skin is warm and dry  Psychiatric: He has a normal mood and affect

## 2018-06-22 ENCOUNTER — HOSPITAL ENCOUNTER (OUTPATIENT)
Dept: ULTRASOUND IMAGING | Facility: HOSPITAL | Age: 23
Discharge: HOME | End: 2018-06-22
Payer: COMMERCIAL

## 2018-06-22 DIAGNOSIS — I88.9 LYMPHADENITIS: ICD-10-CM

## 2018-06-22 PROCEDURE — 76536 US EXAM OF HEAD AND NECK: CPT

## 2018-06-27 ENCOUNTER — TELEPHONE (OUTPATIENT)
Dept: FAMILY MEDICINE CLINIC | Facility: CLINIC | Age: 23
End: 2018-06-27

## 2018-06-27 NOTE — TELEPHONE ENCOUNTER
Ultrasound shows increased lymph node size to the neck  If this is persisting recommend follow-up with ear nose and throat specialist   We typically recommend Dr Esthela Marvin or Dr Olga Lidia Crockett

## 2018-07-13 ENCOUNTER — TRANSCRIBE ORDERS (OUTPATIENT)
Dept: ADMINISTRATIVE | Facility: HOSPITAL | Age: 23
End: 2018-07-13

## 2018-07-13 DIAGNOSIS — R59.0 BILATERAL HILAR ADENOPATHY SYNDROME: Primary | ICD-10-CM

## 2018-07-27 ENCOUNTER — HOSPITAL ENCOUNTER (OUTPATIENT)
Dept: ULTRASOUND IMAGING | Facility: HOSPITAL | Age: 23
Discharge: HOME | End: 2018-07-27
Attending: OTOLARYNGOLOGY
Payer: COMMERCIAL

## 2018-07-27 DIAGNOSIS — R59.0 BILATERAL HILAR ADENOPATHY SYNDROME: ICD-10-CM

## 2018-07-27 PROCEDURE — 76942 ECHO GUIDE FOR BIOPSY: CPT

## 2018-07-27 PROCEDURE — 88172 CYTP DX EVAL FNA 1ST EA SITE: CPT | Performed by: PATHOLOGY

## 2018-07-27 PROCEDURE — 88185 FLOWCYTOMETRY/TC ADD-ON: CPT

## 2018-07-27 PROCEDURE — 88173 CYTOPATH EVAL FNA REPORT: CPT | Performed by: PATHOLOGY

## 2018-07-27 PROCEDURE — 88184 FLOWCYTOMETRY/ TC 1 MARKER: CPT | Performed by: PATHOLOGY

## 2018-07-27 PROCEDURE — 88189 FLOWCYTOMETRY/READ 16 & >: CPT | Performed by: PATHOLOGY

## 2018-07-27 PROCEDURE — 10022 HB FNA W/IMAGE: CPT

## 2018-07-27 RX ORDER — LIDOCAINE HYDROCHLORIDE 10 MG/ML
3 INJECTION, SOLUTION INFILTRATION; PERINEURAL ONCE
Status: COMPLETED | OUTPATIENT
Start: 2018-07-27 | End: 2018-07-27

## 2018-07-27 RX ADMIN — LIDOCAINE HYDROCHLORIDE 3 ML: 10 INJECTION, SOLUTION INFILTRATION; PERINEURAL at 11:53

## 2018-07-30 DIAGNOSIS — F41.1 GENERALIZED ANXIETY DISORDER: ICD-10-CM

## 2018-07-31 DIAGNOSIS — F41.1 GENERALIZED ANXIETY DISORDER: ICD-10-CM

## 2018-07-31 RX ORDER — ALPRAZOLAM 0.5 MG/1
TABLET ORAL
Qty: 90 TABLET | Refills: 0 | Status: SHIPPED | OUTPATIENT
Start: 2018-07-31 | End: 2018-11-19 | Stop reason: SDUPTHER

## 2018-07-31 RX ORDER — ALPRAZOLAM 0.5 MG/1
0.5 TABLET ORAL EVERY 8 HOURS PRN
Qty: 90 TABLET | Refills: 0 | Status: SHIPPED | OUTPATIENT
Start: 2018-07-31 | End: 2018-09-27 | Stop reason: SDUPTHER

## 2018-07-31 NOTE — TELEPHONE ENCOUNTER
From: Shadi Andrews  Sent: 7/30/2018 7:22 PM EDT  Subject: Medication Renewal Request    Shadi Andrews would like a refill of the following medications:     ALPRAZolam (XANAX) 0 5 mg tablet OSORIO Doran    Preferred pharmacy: [facility] #[id number]

## 2018-08-03 LAB — SCAN RESULT: NORMAL

## 2018-09-17 DIAGNOSIS — F41.1 GENERALIZED ANXIETY DISORDER: ICD-10-CM

## 2018-09-18 RX ORDER — ALPRAZOLAM 0.5 MG/1
0.5 TABLET ORAL EVERY 8 HOURS PRN
Qty: 90 TABLET | Refills: 0 | OUTPATIENT
Start: 2018-09-18

## 2018-09-18 NOTE — TELEPHONE ENCOUNTER
Patient called today stating he called the pharmacy to see if his prescription was filled and it has not been sent over  He is calling for follow up  I told patient I would send this to the provider for follow up

## 2018-09-19 NOTE — TELEPHONE ENCOUNTER
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

## 2018-09-27 DIAGNOSIS — F41.1 GENERALIZED ANXIETY DISORDER: ICD-10-CM

## 2018-09-28 RX ORDER — ALPRAZOLAM 0.5 MG/1
0.5 TABLET ORAL EVERY 8 HOURS PRN
Qty: 90 TABLET | Refills: 0 | Status: SHIPPED | OUTPATIENT
Start: 2018-09-28 | End: 2018-10-16 | Stop reason: HOSPADM

## 2018-10-05 RX ORDER — MELATONIN
1000 DAILY
COMMUNITY
End: 2019-05-21

## 2018-10-05 NOTE — PRE-PROCEDURE INSTRUCTIONS
Pre-Surgery Instructions:   Medication Instructions    adalimumab (HUMIRA) 40 mg/0 8 mL PSKT Instructed patient per Anesthesia Guidelines   ALPRAZolam (XANAX) 0 5 mg tablet Instructed patient per Anesthesia Guidelines   cholecalciferol (VITAMIN D3) 1,000 units tablet Instructed patient per Anesthesia Guidelines   multivitamin (THERAGRAN) TABS Instructed patient per Anesthesia Guidelines   Omega-3 Fatty Acids (FISH OIL PO) Instructed patient per Anesthesia Guidelines  Per anesthesia patient was told to stop NSAIDS and supplements one week preop and on DOS with sip of water may take Xanax  Instructed on use of Chlorhexidine for preoperative bathing per hospital protocol

## 2018-10-15 ENCOUNTER — ANESTHESIA EVENT (OUTPATIENT)
Dept: PERIOP | Facility: HOSPITAL | Age: 23
End: 2018-10-15
Payer: COMMERCIAL

## 2018-10-16 ENCOUNTER — ANESTHESIA (OUTPATIENT)
Dept: PERIOP | Facility: HOSPITAL | Age: 23
End: 2018-10-16
Payer: COMMERCIAL

## 2018-10-16 ENCOUNTER — HOSPITAL ENCOUNTER (OUTPATIENT)
Facility: HOSPITAL | Age: 23
Setting detail: OUTPATIENT SURGERY
Discharge: HOME | End: 2018-10-16
Attending: OTOLARYNGOLOGY | Admitting: OTOLARYNGOLOGY
Payer: COMMERCIAL

## 2018-10-16 VITALS
HEART RATE: 55 BPM | DIASTOLIC BLOOD PRESSURE: 54 MMHG | BODY MASS INDEX: 29.06 KG/M2 | TEMPERATURE: 98.5 F | SYSTOLIC BLOOD PRESSURE: 127 MMHG | HEIGHT: 70 IN | RESPIRATION RATE: 18 BRPM | OXYGEN SATURATION: 96 % | WEIGHT: 203 LBS

## 2018-10-16 DIAGNOSIS — R59.0 LOCALIZED ENLARGED LYMPH NODES: ICD-10-CM

## 2018-10-16 DIAGNOSIS — R59.9 LYMPH NODE ENLARGEMENT: Primary | ICD-10-CM

## 2018-10-16 PROCEDURE — 88305 TISSUE EXAM BY PATHOLOGIST: CPT | Performed by: PATHOLOGY

## 2018-10-16 PROCEDURE — 88184 FLOWCYTOMETRY/ TC 1 MARKER: CPT | Performed by: OTOLARYNGOLOGY

## 2018-10-16 PROCEDURE — 88341 IMHCHEM/IMCYTCHM EA ADD ANTB: CPT

## 2018-10-16 PROCEDURE — 88189 FLOWCYTOMETRY/READ 16 & >: CPT | Performed by: PATHOLOGY

## 2018-10-16 PROCEDURE — 88342 IMHCHEM/IMCYTCHM 1ST ANTB: CPT

## 2018-10-16 PROCEDURE — 88365 INSITU HYBRIDIZATION (FISH): CPT

## 2018-10-16 PROCEDURE — 88333 PATH CONSLTJ SURG CYTO XM 1: CPT | Performed by: PATHOLOGY

## 2018-10-16 RX ORDER — GLYCOPYRROLATE 0.2 MG/ML
INJECTION INTRAMUSCULAR; INTRAVENOUS AS NEEDED
Status: DISCONTINUED | OUTPATIENT
Start: 2018-10-16 | End: 2018-10-16 | Stop reason: SURG

## 2018-10-16 RX ORDER — PROPOFOL 10 MG/ML
INJECTION, EMULSION INTRAVENOUS AS NEEDED
Status: DISCONTINUED | OUTPATIENT
Start: 2018-10-16 | End: 2018-10-16 | Stop reason: SURG

## 2018-10-16 RX ORDER — HYDROMORPHONE HCL/PF 1 MG/ML
0.5 SYRINGE (ML) INJECTION
Status: DISCONTINUED | OUTPATIENT
Start: 2018-10-16 | End: 2018-10-16 | Stop reason: HOSPADM

## 2018-10-16 RX ORDER — MIDAZOLAM HYDROCHLORIDE 1 MG/ML
INJECTION INTRAMUSCULAR; INTRAVENOUS AS NEEDED
Status: DISCONTINUED | OUTPATIENT
Start: 2018-10-16 | End: 2018-10-16 | Stop reason: SURG

## 2018-10-16 RX ORDER — MAGNESIUM HYDROXIDE 1200 MG/15ML
LIQUID ORAL AS NEEDED
Status: DISCONTINUED | OUTPATIENT
Start: 2018-10-16 | End: 2018-10-16 | Stop reason: HOSPADM

## 2018-10-16 RX ORDER — CLINDAMYCIN PHOSPHATE 900 MG/50ML
900 INJECTION INTRAVENOUS ONCE
Status: COMPLETED | OUTPATIENT
Start: 2018-10-16 | End: 2018-10-16

## 2018-10-16 RX ORDER — ONDANSETRON 2 MG/ML
4 INJECTION INTRAMUSCULAR; INTRAVENOUS ONCE AS NEEDED
Status: DISCONTINUED | OUTPATIENT
Start: 2018-10-16 | End: 2018-10-16 | Stop reason: HOSPADM

## 2018-10-16 RX ORDER — FENTANYL CITRATE 50 UG/ML
INJECTION, SOLUTION INTRAMUSCULAR; INTRAVENOUS AS NEEDED
Status: DISCONTINUED | OUTPATIENT
Start: 2018-10-16 | End: 2018-10-16 | Stop reason: SURG

## 2018-10-16 RX ORDER — ONDANSETRON 2 MG/ML
4 INJECTION INTRAMUSCULAR; INTRAVENOUS EVERY 6 HOURS PRN
Status: DISCONTINUED | OUTPATIENT
Start: 2018-10-16 | End: 2018-10-16 | Stop reason: HOSPADM

## 2018-10-16 RX ORDER — FENTANYL CITRATE/PF 50 MCG/ML
50 SYRINGE (ML) INJECTION
Status: DISCONTINUED | OUTPATIENT
Start: 2018-10-16 | End: 2018-10-16 | Stop reason: HOSPADM

## 2018-10-16 RX ORDER — DEXTROSE MONOHYDRATE AND SODIUM CHLORIDE 5; .45 G/100ML; G/100ML
125 INJECTION, SOLUTION INTRAVENOUS CONTINUOUS
Status: DISCONTINUED | OUTPATIENT
Start: 2018-10-16 | End: 2018-10-16 | Stop reason: HOSPADM

## 2018-10-16 RX ORDER — CLONAZEPAM 1 MG/1
1 TABLET ORAL DAILY PRN
COMMUNITY
End: 2018-11-19 | Stop reason: ALTCHOICE

## 2018-10-16 RX ORDER — OXYCODONE HYDROCHLORIDE AND ACETAMINOPHEN 5; 325 MG/1; MG/1
TABLET ORAL
Refills: 0
Start: 2018-10-16 | End: 2019-01-15 | Stop reason: ALTCHOICE

## 2018-10-16 RX ORDER — MEPERIDINE HYDROCHLORIDE 50 MG/ML
12.5 INJECTION INTRAMUSCULAR; INTRAVENOUS; SUBCUTANEOUS ONCE AS NEEDED
Status: DISCONTINUED | OUTPATIENT
Start: 2018-10-16 | End: 2018-10-16 | Stop reason: HOSPADM

## 2018-10-16 RX ORDER — SODIUM CHLORIDE 9 MG/ML
125 INJECTION, SOLUTION INTRAVENOUS CONTINUOUS
Status: DISCONTINUED | OUTPATIENT
Start: 2018-10-16 | End: 2018-10-16 | Stop reason: HOSPADM

## 2018-10-16 RX ORDER — LIDOCAINE HYDROCHLORIDE AND EPINEPHRINE BITARTRATE 20; .01 MG/ML; MG/ML
INJECTION, SOLUTION SUBCUTANEOUS AS NEEDED
Status: DISCONTINUED | OUTPATIENT
Start: 2018-10-16 | End: 2018-10-16 | Stop reason: HOSPADM

## 2018-10-16 RX ORDER — OXYCODONE HYDROCHLORIDE AND ACETAMINOPHEN 5; 325 MG/1; MG/1
2 TABLET ORAL EVERY 4 HOURS PRN
Status: DISCONTINUED | OUTPATIENT
Start: 2018-10-16 | End: 2018-10-16 | Stop reason: HOSPADM

## 2018-10-16 RX ORDER — ACETAMINOPHEN 325 MG/1
650 TABLET ORAL EVERY 4 HOURS PRN
Status: DISCONTINUED | OUTPATIENT
Start: 2018-10-16 | End: 2018-10-16 | Stop reason: HOSPADM

## 2018-10-16 RX ORDER — ROCURONIUM BROMIDE 10 MG/ML
INJECTION, SOLUTION INTRAVENOUS AS NEEDED
Status: DISCONTINUED | OUTPATIENT
Start: 2018-10-16 | End: 2018-10-16 | Stop reason: SURG

## 2018-10-16 RX ORDER — DEXAMETHASONE SODIUM PHOSPHATE 4 MG/ML
INJECTION, SOLUTION INTRA-ARTICULAR; INTRALESIONAL; INTRAMUSCULAR; INTRAVENOUS; SOFT TISSUE AS NEEDED
Status: DISCONTINUED | OUTPATIENT
Start: 2018-10-16 | End: 2018-10-16 | Stop reason: SURG

## 2018-10-16 RX ADMIN — GLYCOPYRROLATE 0.4 MG: 0.2 INJECTION, SOLUTION INTRAMUSCULAR; INTRAVENOUS at 10:43

## 2018-10-16 RX ADMIN — ONDANSETRON HYDROCHLORIDE 4 MG: 2 INJECTION, SOLUTION INTRAVENOUS at 10:12

## 2018-10-16 RX ADMIN — FENTANYL CITRATE 100 MCG: 50 INJECTION, SOLUTION INTRAMUSCULAR; INTRAVENOUS at 09:41

## 2018-10-16 RX ADMIN — DEXAMETHASONE SODIUM PHOSPHATE 4 MG: 4 INJECTION, SOLUTION INTRAMUSCULAR; INTRAVENOUS at 09:55

## 2018-10-16 RX ADMIN — NEOSTIGMINE METHYLSULFATE 3 MG: 1 INJECTION, SOLUTION INTRAMUSCULAR; INTRAVENOUS; SUBCUTANEOUS at 10:43

## 2018-10-16 RX ADMIN — FENTANYL CITRATE 50 MCG: 50 INJECTION, SOLUTION INTRAMUSCULAR; INTRAVENOUS at 11:31

## 2018-10-16 RX ADMIN — MIDAZOLAM 2 MG: 1 INJECTION INTRAMUSCULAR; INTRAVENOUS at 09:30

## 2018-10-16 RX ADMIN — LIDOCAINE HYDROCHLORIDE 80 MG: 20 INJECTION, SOLUTION INTRAVENOUS at 09:41

## 2018-10-16 RX ADMIN — FENTANYL CITRATE 50 MCG: 50 INJECTION, SOLUTION INTRAMUSCULAR; INTRAVENOUS at 10:32

## 2018-10-16 RX ADMIN — ROCURONIUM BROMIDE 40 MG: 10 INJECTION INTRAVENOUS at 09:41

## 2018-10-16 RX ADMIN — ONDANSETRON HYDROCHLORIDE 4 MG: 2 INJECTION, SOLUTION INTRAVENOUS at 09:55

## 2018-10-16 RX ADMIN — FENTANYL CITRATE 50 MCG: 50 INJECTION, SOLUTION INTRAMUSCULAR; INTRAVENOUS at 10:54

## 2018-10-16 RX ADMIN — FENTANYL CITRATE 50 MCG: 50 INJECTION, SOLUTION INTRAMUSCULAR; INTRAVENOUS at 11:23

## 2018-10-16 RX ADMIN — SODIUM CHLORIDE 125 ML/HR: 0.9 INJECTION, SOLUTION INTRAVENOUS at 09:20

## 2018-10-16 RX ADMIN — PROPOFOL 300 MG: 10 INJECTION, EMULSION INTRAVENOUS at 09:41

## 2018-10-16 RX ADMIN — OXYCODONE AND ACETAMINOPHEN 1 TABLET: 5; 325 TABLET ORAL at 12:11

## 2018-10-16 RX ADMIN — CLINDAMYCIN PHOSPHATE 900 MG: 18 INJECTION, SOLUTION INTRAMUSCULAR; INTRAVENOUS at 09:40

## 2018-10-16 RX ADMIN — FENTANYL CITRATE 100 MCG: 50 INJECTION, SOLUTION INTRAMUSCULAR; INTRAVENOUS at 09:56

## 2018-10-16 NOTE — OP NOTE
OPERATIVE REPORT  PATIENT NAME: Batsheva Guerra    :  1995  MRN: [id number]  Pt Location: [facility] OR ROOM 03    SURGERY DATE: 10/16/2018    Surgeon(s) and Role:     * Frank Macario DO - Primary    Preop Diagnosis:  Localized enlarged lymph nodes [R59 0]    Post-Op Diagnosis Codes:     * Localized enlarged lymph nodes [R59 0]    Procedure(s) (LRB):  DEEP NECK MASS/DEEP NODE EXCISION (Left)    Specimen(s):  ID Type Source Tests Collected by Time Destination   1 : Left Neck Lymph Node Tissue Lymph Node TISSUE EXAM, LEUKEMIA/LYMPHOMA FLOW CYTOMETRY Frank DO Renaldo 10/16/2018 1003        Estimated Blood Loss:   15 mL    Drains:       Anesthesia Type:   General    Operative Indications:  Localized enlarged lymph nodes [R59 0]      Operative Findings:  Large deep left cervical lymph node measuring 4 x 2 centimeters    Complications:   None    Procedure and Technique:  Patient was identified in the holding area  He was marked on the left side of the neck to denote the laterality of the case  He was given IV antibiotics on-call to the operating room  He was placed on the OR table in supine position and placed under general anesthesia without any difficulty  He was placed on a shoulder roll with the head in slight extension and turned to the right side  Left neck was previously marked but had been cleansed with alcohol and allowed to dry appropriately  Marking pen was used to harpreet out the angle of the mandible the anterior border of the sternocleidomastoid and the node which was located just deep and posterior to the submandibular gland  Brief time-out was obtained and all information was noted to be correct  2 percent xylocaine with 1 100,000 epinephrine was injected into the previously marked incision site which was horizontal below the angle of the jaw  A total of 4 mL was used  He was then prepped and draped sterile fashion  Formal time-out was now obtained    Incision was made in the previously injected and marked site with a 15 blade  Any bleeding vessels were cauterized the Bovie cautery  Subcutaneous fat was  and the platysma was  with a hemostat and Bovie cautery  I then  the anterior body of the sternocleidomastoid muscle away from the deeper tissues  There was a large nerve that was crossing the area and this was left untouched  I was able to work deep to the submandibular gland and identify the lymph node  This tracked all the way up to the posterior most portion of the posterior belly of the digastric muscle  Superiorly this was difficult to get to in right angles were needed to separate any of the attachments and vessels  I  this using the right angle forcep and tied any of the tissues with 2 0 silk ties  Eventually the entire lymph node was removed in total and sent down for specimen  Touch preps appeared to be negative in just appeared to be a benign lymph node but this will be sent out for flow cytometry  Wound was then evaluated and noted to be dry  The incision site was closed with interrupted 4 0 Vicryl for the platysma followed by a subcuticular 4 0 Monocryl and tissue glue for the skin  He was cleansed with wet to dries  He was woken, extubated, and taken to recovery in stable condition     I was present for the entire procedure    Patient Disposition:  PACU     SIGNATURE: Hilario Kenny DO  DATE: October 16, 2018  TIME: 10:49 AM

## 2018-10-16 NOTE — ANESTHESIA PREPROCEDURE EVALUATION
Review of Systems/Medical History  Patient summary reviewed  Chart reviewed  History of anesthetic complications PONV    Cardiovascular  Negative cardio ROS    Pulmonary  Pneumonia,        GI/Hepatic      Comment: CROHN'S DZ     Negative  ROS        Endo/Other  Negative endo/other ROS      GYN       Hematology  Negative hematology ROS      Musculoskeletal  Negative musculoskeletal ROS        Neurology  Negative neurology ROS      Psychology   Anxiety,              Physical Exam    Airway    Mallampati score: II  TM Distance: >3 FB  Neck ROM: full     Dental   No notable dental hx     Cardiovascular  Comment: Negative ROS, Rhythm: regular, Rate: normal, Cardiovascular exam normal    Pulmonary  Pulmonary exam normal Breath sounds clear to auscultation,     Other Findings        Anesthesia Plan  ASA Score- 2     Anesthesia Type- general with ASA Monitors  Additional Monitors:   Airway Plan:     Comment: IV ANTIEMETICS  Plan Factors-Patient not instructed to abstain from smoking on day of procedure  Patient did not smoke on day of surgery  Induction- intravenous  Postoperative Plan-     Informed Consent- Anesthetic plan and risks discussed with patient

## 2018-10-16 NOTE — DISCHARGE INSTRUCTIONS
[facility]    POST OPERATIVE EXCISION INSTRUCTIONS        1  Keep area of incision clean and dry  2   You may shower 24 hours after surgery  Do not scrub incision site  Pat gently with a towel  Do not soak wound  3   No smoking  Avoid second hand smoke exposure  4   Call office at [phone] or [phone] for any of the following:  Fever greater than 101°F, redness or warmth of surgical area, acute swelling of surgical area, discharge from surgical area

## 2018-10-22 LAB — SCAN RESULT: NORMAL

## 2018-11-15 ENCOUNTER — TELEPHONE (OUTPATIENT)
Dept: FAMILY MEDICINE CLINIC | Facility: CLINIC | Age: 23
End: 2018-11-15

## 2018-11-15 NOTE — TELEPHONE ENCOUNTER
Patient mother Layo Garcia wanted a doctor you would prefer he see for cardiology, he will use the referral from June please advise

## 2018-11-19 ENCOUNTER — OFFICE VISIT (OUTPATIENT)
Dept: FAMILY MEDICINE CLINIC | Facility: CLINIC | Age: 23
End: 2018-11-19
Payer: COMMERCIAL

## 2018-11-19 VITALS
BODY MASS INDEX: 29.78 KG/M2 | RESPIRATION RATE: 24 BRPM | DIASTOLIC BLOOD PRESSURE: 82 MMHG | HEIGHT: 70 IN | TEMPERATURE: 97.4 F | HEART RATE: 76 BPM | WEIGHT: 208 LBS | SYSTOLIC BLOOD PRESSURE: 130 MMHG

## 2018-11-19 DIAGNOSIS — F41.1 GENERALIZED ANXIETY DISORDER: Primary | ICD-10-CM

## 2018-11-19 DIAGNOSIS — R00.2 PALPITATIONS: ICD-10-CM

## 2018-11-19 PROCEDURE — 99214 OFFICE O/P EST MOD 30 MIN: CPT | Performed by: FAMILY MEDICINE

## 2018-11-19 RX ORDER — ALPRAZOLAM 1 MG/1
1 TABLET ORAL EVERY 8 HOURS PRN
Qty: 90 TABLET | Refills: 0 | Status: SHIPPED | OUTPATIENT
Start: 2018-11-19 | End: 2018-12-19 | Stop reason: SDUPTHER

## 2018-11-19 RX ORDER — ESCITALOPRAM OXALATE 10 MG/1
10 TABLET ORAL DAILY
Qty: 90 TABLET | Refills: 0 | Status: SHIPPED | OUTPATIENT
Start: 2018-11-19 | End: 2019-05-21

## 2018-11-19 RX ORDER — ADALIMUMAB 40MG/0.8ML
KIT SUBCUTANEOUS
COMMUNITY
Start: 2018-11-12 | End: 2019-08-09

## 2018-11-19 NOTE — PROGRESS NOTES
Assessment/Plan:  We discussed that his anxiety may be contributing to the chest pain symptoms  Recommended restarting SSRI  Prescription for Lexapro sent  Increase alprazolam to 1 mg every 8 hr as needed  He is continuing to see a therapist as well  He will call if symptoms persist or worsen  He will follow up with cardiologist tomorrow  No problem-specific Assessment & Plan notes found for this encounter  Diagnoses and all orders for this visit:    Generalized anxiety disorder  -     escitalopram (LEXAPRO) 10 mg tablet; Take 1 tablet (10 mg total) by mouth daily for 90 days For anxiety  -     ALPRAZolam (XANAX) 1 mg tablet; Take 1 tablet (1 mg total) by mouth every 8 (eight) hours as needed for anxiety or sleep    Palpitations  -     escitalopram (LEXAPRO) 10 mg tablet; Take 1 tablet (10 mg total) by mouth daily for 90 days For anxiety    Other orders  -     HUMIRA PEN 40 MG/0 8ML PNKT;           Subjective:      Patient ID: Tanisha Sumner is a 26 y o  male  Patient with history of generalized anxiety over the last several months  He was on an SSRI in the past    He was previously on fluoxetine but has not been taken any over the last few months  Anxiety is worsened at school  Patient notes that he occasionally has palpitations  He states he is scheduled to see a cardiologist tomorrow  He has seen a cardiologist in the past   He has home from school for few weeks on Thanksgiving break  He denies any recent chest pains or shortness of breath  Denies exertional chest pain  The following portions of the patient's history were reviewed and updated as appropriate: allergies, current medications, past family history, past medical history, past social history, past surgical history and problem list     Review of Systems   Constitutional: Negative  HENT: Negative  Eyes: Negative  Respiratory: Negative  Cardiovascular: Negative  Gastrointestinal: Negative      Endocrine: Negative  Genitourinary: Negative  Musculoskeletal: Negative  Skin: Negative  Allergic/Immunologic: Negative  Neurological: Negative  Hematological: Negative  Psychiatric/Behavioral: Negative for dysphoric mood and self-injury  The patient is nervous/anxious            Objective:      /82 (BP Location: Left arm, Patient Position: Sitting, Cuff Size: Large)   Pulse 76   Temp (!) 97 4 °F (36 3 °C) (Oral)   Resp (!) 24   Ht 5' 10" (1 778 m)   Wt 94 3 kg (208 lb)   BMI 29 84 kg/m²          Physical Exam

## 2018-11-20 ENCOUNTER — OFFICE VISIT (OUTPATIENT)
Dept: CARDIOLOGY CLINIC | Facility: CLINIC | Age: 23
End: 2018-11-20
Payer: COMMERCIAL

## 2018-11-20 VITALS
SYSTOLIC BLOOD PRESSURE: 130 MMHG | HEIGHT: 70 IN | DIASTOLIC BLOOD PRESSURE: 70 MMHG | WEIGHT: 207 LBS | BODY MASS INDEX: 29.63 KG/M2

## 2018-11-20 DIAGNOSIS — R07.9 CHEST PAIN, UNSPECIFIED TYPE: Primary | ICD-10-CM

## 2018-11-20 PROCEDURE — 93000 ELECTROCARDIOGRAM COMPLETE: CPT | Performed by: INTERNAL MEDICINE

## 2018-11-20 PROCEDURE — 99214 OFFICE O/P EST MOD 30 MIN: CPT | Performed by: INTERNAL MEDICINE

## 2018-11-20 NOTE — LETTER
2018     Tisha Gamez, [address] [facility],[address]  [address] [place] [address]    Patient: Tiffanie Rocha   YOB: 1995   Date of Visit: 2018       Dear Dr Claudene Antonio: Thank you for referring Tiffanie Rocha to me for evaluation  Below are my notes for this consultation  If you have questions, please do not hesitate to call me  I look forward to following your patient along with you  Sincerely,        Angelika Diaz MD        CC: No Recipients  Angelika Diaz MD  2018  4:30 PM  Sign at close encounter  [address] [facility] [place]  [address] [facility]  [phone]    Cardiology Follow up    Patient:  Tiffanie Rocha  :  1995  MRN:  [id number]    History of Present Illness:     82-year-old man with past medical history of Crohn's disease status post resection 7 years ago as well as anxiety presents for cardiology follow-up  He has numerous complaints of exertional and nonexertional chest pain that he describes as pressure and sharp that last a few seconds to a minute  He has had shortness of breath with stairs that feels more than usual     He notes his heart rate is increased after meals as well  He has checked his heart rate and is about 90 during those times  Occasionally feels dizzy but has not had any syncope  He notes he started on Lexapro yesterday because of his anxiety  No family history of coronary artery disease      Patient Active Problem List   Diagnosis    Crohn's disease with complication ([address] )    Generalized anxiety disorder    Vitamin D deficiency    High risk medications (not anticoagulants) long-term use       Past Surgical History  Past Surgical History:   Procedure Laterality Date    BOWEL RESECTION      Small bowel    COLONOSCOPY      ESOPHAGOGASTRODUODENOSCOPY      SC BX/REMV,LYMPH NODE,DEEP CERV Left 10/16/2018    Procedure: DEEP NECK MASS/DEEP NODE EXCISION;  Surgeon: Patience Connors DO;  Location: [facility];  Service: ENT       Social History   Social History     Social History    Marital status: Single     Spouse name: N/A    Number of children: N/A    Years of education: N/A     Occupational History    Not on file  Social History Main Topics    Smoking status: Never Smoker    Smokeless tobacco: Never Used    Alcohol use Yes      Comment: 5 or 6 in a weekend    Drug use: No      Comment: Pt stopped using    Sexual activity: Yes     Partners: Female     Other Topics Concern    Not on file     Social History Narrative    No narrative on file        Allergies   Allergen Reactions    Penicillins Hives and Rash       Family History   Family History   Problem Relation Age of Onset    Anxiety disorder Mother     Crohn's disease Family     Ulcerative colitis Family        Review of Systems:  Review of Systems   Constitutional: Negative for chills, fatigue and fever  HENT: Negative for hearing loss and trouble swallowing  Eyes: Negative for pain  Respiratory: Positive for chest tightness and shortness of breath  Negative for cough  Cardiovascular: Positive for palpitations  Negative for chest pain and leg swelling  Gastrointestinal: Negative for abdominal pain, blood in stool, nausea and vomiting  Endocrine: Negative for cold intolerance and heat intolerance  Genitourinary: Negative for difficulty urinating, frequency and hematuria  Musculoskeletal: Negative for arthralgias and neck pain  Skin: Negative for rash  Allergic/Immunologic: Negative for environmental allergies  Neurological: Negative for dizziness, weakness and headaches  Hematological: Does not bruise/bleed easily  Psychiatric/Behavioral: Negative for decreased concentration and sleep disturbance  The patient is not nervous/anxious          Current Outpatient Prescriptions:     adalimumab (HUMIRA) 40 mg/0 8 mL PSKT, Inject 40 mg under the skin once a week  , Disp: , Rfl:     ALPRAZolam (XANAX) 1 mg tablet, Take 1 tablet (1 mg total) by mouth every 8 (eight) hours as needed for anxiety or sleep, Disp: 90 tablet, Rfl: 0    cholecalciferol (VITAMIN D3) 1,000 units tablet, Take 1,000 Units by mouth daily, Disp: , Rfl:     escitalopram (LEXAPRO) 10 mg tablet, Take 1 tablet (10 mg total) by mouth daily for 90 days For anxiety, Disp: 90 tablet, Rfl: 0    HUMIRA PEN 40 MG/0 8ML PNKT, , Disp: , Rfl:     multivitamin (THERAGRAN) TABS, Take 1 tablet by mouth daily, Disp: , Rfl:     Omega-3 Fatty Acids (FISH OIL PO), Take 2 capsules by mouth daily  , Disp: , Rfl:     oxyCODONE-acetaminophen (PERCOCET) 5-325 mg per tablet, Take as directed on bottle at home (Patient not taking: Reported on 11/19/2018 ), Disp: , Rfl: 0     Physical Exam:    Vitals:    11/20/18 1548   BP: 130/70   BP Location: Right arm   Patient Position: Sitting   Cuff Size: Large   Weight: 93 9 kg (207 lb)   Height: 5' 10" (1 778 m)       Physical Exam   Constitutional: He is oriented to person, place, and time  He appears well-developed and well-nourished  HENT:   Head: Normocephalic  Right Ear: External ear normal    Left Ear: External ear normal    Mouth/Throat: Oropharynx is clear and moist    Eyes: Pupils are equal, round, and reactive to light  Neck: No JVD present  Carotid bruit is not present  Cardiovascular: Normal rate, regular rhythm and intact distal pulses  Exam reveals no gallop and no friction rub  No murmur heard  Pulmonary/Chest: Effort normal and breath sounds normal  No tachypnea  No respiratory distress  He has no wheezes  He has no rales  He exhibits no tenderness  Abdominal: Soft  He exhibits no distension  There is no tenderness  There is no rebound and no guarding  Musculoskeletal: He exhibits no edema  Neurological: He is alert and oriented to person, place, and time  Skin: Skin is warm and dry  Psychiatric: He has a normal mood and affect   His behavior is normal  Judgment and thought content normal    Nursing note and vitals reviewed  Labs:not applicable    Assessment/Plan:    1  Multiple complaints of chest discomfort as well as shortness of breath  I do not have a high suspicion for cardiovascular disease for him and I did tell him this  I think by far more likely is  anxiety as an etiology for his multiple somatic complaints  We will get an echocardiogram and stress electrocardiogram it will follow-up on the results of this  2   Pre hypertension-I did ask him to take magnesium 200 mg nightly and he can take up to 400 mg nightly  I asked him to watch his sodium intake and increase his aerobic exercise and increase his fruits and vegetables        Erving Epley, MD  11/20/2018  4:00 PM

## 2018-11-20 NOTE — PROGRESS NOTES
[address] [facility]  [address]  [facility] [address] [facility]  [phone]    Cardiology Follow up    Patient:  Gustavo Leung  :  1995  MRN:  [id number]    History of Present Illness:     21-year-old man with past medical history of Crohn's disease status post resection 7 years ago as well as anxiety presents for cardiology follow-up  He has numerous complaints of exertional and nonexertional chest pain that he describes as pressure and sharp that last a few seconds to a minute  He has had shortness of breath with stairs that feels more than usual     He notes his heart rate is increased after meals as well  He has checked his heart rate and is about 90 during those times  Occasionally feels dizzy but has not had any syncope  He notes he started on Lexapro yesterday because of his anxiety  No family history of coronary artery disease  Patient Active Problem List   Diagnosis    Crohn's disease with complication ([address] )    Generalized anxiety disorder    Vitamin D deficiency    High risk medications (not anticoagulants) long-term use       Past Surgical History  Past Surgical History:   Procedure Laterality Date    BOWEL RESECTION      Small bowel    COLONOSCOPY      ESOPHAGOGASTRODUODENOSCOPY      IN BX/REMV,LYMPH NODE,DEEP CERV Left 10/16/2018    Procedure: DEEP NECK MASS/DEEP NODE EXCISION;  Surgeon: Ubaldo Fernandez DO;  Location: [facility];  Service: ENT       Social History   Social History     Social History    Marital status: Single     Spouse name: N/A    Number of children: N/A    Years of education: N/A     Occupational History    Not on file       Social History Main Topics    Smoking status: Never Smoker    Smokeless tobacco: Never Used    Alcohol use Yes      Comment: 5 or 6 in a weekend    Drug use: No      Comment: Pt stopped using    Sexual activity: Yes     Partners: Female     Other Topics Concern    Not on file     Social History Narrative  No narrative on file        Allergies   Allergen Reactions    Penicillins Hives and Rash       Family History   Family History   Problem Relation Age of Onset    Anxiety disorder Mother     Crohn's disease Family     Ulcerative colitis Family        Review of Systems:  Review of Systems   Constitutional: Negative for chills, fatigue and fever  HENT: Negative for hearing loss and trouble swallowing  Eyes: Negative for pain  Respiratory: Positive for chest tightness and shortness of breath  Negative for cough  Cardiovascular: Positive for palpitations  Negative for chest pain and leg swelling  Gastrointestinal: Negative for abdominal pain, blood in stool, nausea and vomiting  Endocrine: Negative for cold intolerance and heat intolerance  Genitourinary: Negative for difficulty urinating, frequency and hematuria  Musculoskeletal: Negative for arthralgias and neck pain  Skin: Negative for rash  Allergic/Immunologic: Negative for environmental allergies  Neurological: Negative for dizziness, weakness and headaches  Hematological: Does not bruise/bleed easily  Psychiatric/Behavioral: Negative for decreased concentration and sleep disturbance  The patient is not nervous/anxious          Current Outpatient Prescriptions:     adalimumab (HUMIRA) 40 mg/0 8 mL PSKT, Inject 40 mg under the skin once a week  , Disp: , Rfl:     ALPRAZolam (XANAX) 1 mg tablet, Take 1 tablet (1 mg total) by mouth every 8 (eight) hours as needed for anxiety or sleep, Disp: 90 tablet, Rfl: 0    cholecalciferol (VITAMIN D3) 1,000 units tablet, Take 1,000 Units by mouth daily, Disp: , Rfl:     escitalopram (LEXAPRO) 10 mg tablet, Take 1 tablet (10 mg total) by mouth daily for 90 days For anxiety, Disp: 90 tablet, Rfl: 0    HUMIRA PEN 40 MG/0 8ML PNKT, , Disp: , Rfl:     multivitamin (THERAGRAN) TABS, Take 1 tablet by mouth daily, Disp: , Rfl:     Omega-3 Fatty Acids (FISH OIL PO), Take 2 capsules by mouth daily , Disp: , Rfl:     oxyCODONE-acetaminophen (PERCOCET) 5-325 mg per tablet, Take as directed on bottle at home (Patient not taking: Reported on 11/19/2018 ), Disp: , Rfl: 0     Physical Exam:    Vitals:    11/20/18 1548   BP: 130/70   BP Location: Right arm   Patient Position: Sitting   Cuff Size: Large   Weight: 93 9 kg (207 lb)   Height: 5' 10" (1 778 m)       Physical Exam   Constitutional: He is oriented to person, place, and time  He appears well-developed and well-nourished  HENT:   Head: Normocephalic  Right Ear: External ear normal    Left Ear: External ear normal    Mouth/Throat: Oropharynx is clear and moist    Eyes: Pupils are equal, round, and reactive to light  Neck: No JVD present  Carotid bruit is not present  Cardiovascular: Normal rate, regular rhythm and intact distal pulses  Exam reveals no gallop and no friction rub  No murmur heard  Pulmonary/Chest: Effort normal and breath sounds normal  No tachypnea  No respiratory distress  He has no wheezes  He has no rales  He exhibits no tenderness  Abdominal: Soft  He exhibits no distension  There is no tenderness  There is no rebound and no guarding  Musculoskeletal: He exhibits no edema  Neurological: He is alert and oriented to person, place, and time  Skin: Skin is warm and dry  Psychiatric: He has a normal mood and affect  His behavior is normal  Judgment and thought content normal    Nursing note and vitals reviewed  Labs:not applicable    Assessment/Plan:    1  Multiple complaints of chest discomfort as well as shortness of breath  I do not have a high suspicion for cardiovascular disease for him and I did tell him this  I think by far more likely is  anxiety as an etiology for his multiple somatic complaints  We will get an echocardiogram and stress electrocardiogram it will follow-up on the results of this      2   Pre hypertension-I did ask him to take magnesium 200 mg nightly and he can take up to 400 mg nightly  I asked him to watch his sodium intake and increase his aerobic exercise and increase his fruits and vegetables        Redgie Riedel, MD  11/20/2018  4:00 PM

## 2018-11-23 ENCOUNTER — HOSPITAL ENCOUNTER (OUTPATIENT)
Dept: NON INVASIVE DIAGNOSTICS | Facility: CLINIC | Age: 23
Discharge: HOME | End: 2018-11-23
Payer: COMMERCIAL

## 2018-11-23 DIAGNOSIS — R07.9 CHEST PAIN, UNSPECIFIED TYPE: ICD-10-CM

## 2018-11-23 LAB
ARRHY DURING EX: NORMAL
CHEST PAIN STATEMENT: NORMAL
MAX DIASTOLIC BP: 90 MMHG
MAX HEART RATE: 196 BPM
MAX PREDICTED HEART RATE: 199 BPM
MAX. SYSTOLIC BP: 190 MMHG
PROTOCOL NAME: NORMAL
REASON FOR TERMINATION: NORMAL
TARGET HR FORMULA: NORMAL
TEST INDICATION: NORMAL
TIME IN EXERCISE PHASE: NORMAL

## 2018-11-23 PROCEDURE — 93016 CV STRESS TEST SUPVJ ONLY: CPT | Performed by: INTERNAL MEDICINE

## 2018-11-23 PROCEDURE — 93017 CV STRESS TEST TRACING ONLY: CPT

## 2018-11-23 PROCEDURE — 93018 CV STRESS TEST I&R ONLY: CPT | Performed by: INTERNAL MEDICINE

## 2018-11-29 ENCOUNTER — TELEPHONE (OUTPATIENT)
Dept: CARDIOLOGY CLINIC | Facility: CLINIC | Age: 23
End: 2018-11-29

## 2018-12-19 DIAGNOSIS — F41.1 GENERALIZED ANXIETY DISORDER: ICD-10-CM

## 2018-12-19 RX ORDER — ALPRAZOLAM 1 MG/1
1 TABLET ORAL EVERY 8 HOURS PRN
Qty: 90 TABLET | Refills: 0 | OUTPATIENT
Start: 2018-12-19 | End: 2019-01-14 | Stop reason: SDUPTHER

## 2018-12-27 ENCOUNTER — CLINICAL SUPPORT (OUTPATIENT)
Dept: FAMILY MEDICINE CLINIC | Facility: CLINIC | Age: 23
End: 2018-12-27
Payer: COMMERCIAL

## 2018-12-27 ENCOUNTER — HOSPITAL ENCOUNTER (OUTPATIENT)
Dept: NON INVASIVE DIAGNOSTICS | Facility: CLINIC | Age: 23
Discharge: HOME | End: 2018-12-27
Payer: COMMERCIAL

## 2018-12-27 DIAGNOSIS — Z23 NEED FOR INFLUENZA VACCINATION: Primary | ICD-10-CM

## 2018-12-27 DIAGNOSIS — R07.9 CHEST PAIN, UNSPECIFIED TYPE: ICD-10-CM

## 2018-12-27 PROCEDURE — 93306 TTE W/DOPPLER COMPLETE: CPT | Performed by: INTERNAL MEDICINE

## 2018-12-27 PROCEDURE — 93306 TTE W/DOPPLER COMPLETE: CPT

## 2018-12-27 PROCEDURE — 90686 IIV4 VACC NO PRSV 0.5 ML IM: CPT

## 2018-12-27 PROCEDURE — 90471 IMMUNIZATION ADMIN: CPT

## 2018-12-31 ENCOUNTER — TELEPHONE (OUTPATIENT)
Dept: CARDIOLOGY CLINIC | Facility: CLINIC | Age: 23
End: 2018-12-31

## 2018-12-31 NOTE — TELEPHONE ENCOUNTER
Phone call from patient wanting Echo results that he saw "on my chart" but needs an explanation as to what it means      [phone]

## 2019-01-12 DIAGNOSIS — F41.1 GENERALIZED ANXIETY DISORDER: ICD-10-CM

## 2019-01-12 RX ORDER — ALPRAZOLAM 1 MG/1
TABLET ORAL
Qty: 90 TABLET | Refills: 0 | Status: CANCELLED | OUTPATIENT
Start: 2019-01-12

## 2019-01-14 DIAGNOSIS — F41.1 GENERALIZED ANXIETY DISORDER: ICD-10-CM

## 2019-01-15 DIAGNOSIS — F41.1 GENERALIZED ANXIETY DISORDER: ICD-10-CM

## 2019-01-15 RX ORDER — ALPRAZOLAM 1 MG/1
1 TABLET ORAL EVERY 8 HOURS PRN
Qty: 90 TABLET | Refills: 0 | OUTPATIENT
Start: 2019-01-15 | End: 2019-01-15 | Stop reason: SDUPTHER

## 2019-01-15 RX ORDER — ALPRAZOLAM 1 MG/1
1 TABLET ORAL 3 TIMES DAILY PRN
Qty: 90 TABLET | Refills: 0 | Status: SHIPPED | OUTPATIENT
Start: 2019-01-15 | End: 2019-05-21 | Stop reason: ALTCHOICE

## 2019-01-15 RX ORDER — ALPRAZOLAM 1 MG/1
TABLET ORAL
Qty: 90 TABLET | Refills: 0 | OUTPATIENT
Start: 2019-01-15

## 2019-01-15 NOTE — TELEPHONE ENCOUNTER
Arcelia Morales called back, he is leaving for school later this afternoon and needs this filled today  Please call patient when it is sent

## 2019-01-15 NOTE — TELEPHONE ENCOUNTER
Per [facility], prior auth needed for medication  Prior Veatrice Fruits has been started in [place]

## 2019-05-21 ENCOUNTER — OFFICE VISIT (OUTPATIENT)
Dept: FAMILY MEDICINE CLINIC | Facility: CLINIC | Age: 24
End: 2019-05-21
Payer: COMMERCIAL

## 2019-05-21 ENCOUNTER — TELEPHONE (OUTPATIENT)
Dept: FAMILY MEDICINE CLINIC | Facility: CLINIC | Age: 24
End: 2019-05-21

## 2019-05-21 VITALS
TEMPERATURE: 98.5 F | HEIGHT: 69 IN | DIASTOLIC BLOOD PRESSURE: 92 MMHG | HEART RATE: 82 BPM | SYSTOLIC BLOOD PRESSURE: 138 MMHG | BODY MASS INDEX: 31.55 KG/M2 | WEIGHT: 213 LBS | OXYGEN SATURATION: 98 %

## 2019-05-21 DIAGNOSIS — F41.1 GENERALIZED ANXIETY DISORDER: ICD-10-CM

## 2019-05-21 DIAGNOSIS — R03.0 ELEVATED BLOOD PRESSURE READING: Primary | ICD-10-CM

## 2019-05-21 DIAGNOSIS — K50.919 CROHN'S DISEASE WITH COMPLICATION, UNSPECIFIED GASTROINTESTINAL TRACT LOCATION (HCC): ICD-10-CM

## 2019-05-21 PROCEDURE — 3008F BODY MASS INDEX DOCD: CPT | Performed by: FAMILY MEDICINE

## 2019-05-21 PROCEDURE — 99214 OFFICE O/P EST MOD 30 MIN: CPT | Performed by: FAMILY MEDICINE

## 2019-05-21 RX ORDER — CLONAZEPAM 0.5 MG/1
0.5 TABLET, ORALLY DISINTEGRATING ORAL 2 TIMES DAILY
Qty: 180 TABLET | Refills: 0 | Status: SHIPPED | OUTPATIENT
Start: 2019-05-21 | End: 2019-05-21 | Stop reason: CLARIF

## 2019-05-21 RX ORDER — CLONAZEPAM 0.5 MG/1
0.5 TABLET ORAL 2 TIMES DAILY
Qty: 60 TABLET | Refills: 2 | Status: SHIPPED | OUTPATIENT
Start: 2019-05-21 | End: 2019-09-11 | Stop reason: SDUPTHER

## 2019-05-24 ENCOUNTER — OFFICE VISIT (OUTPATIENT)
Dept: URGENT CARE | Age: 24
End: 2019-05-24
Payer: COMMERCIAL

## 2019-05-24 VITALS
HEART RATE: 86 BPM | DIASTOLIC BLOOD PRESSURE: 77 MMHG | WEIGHT: 210 LBS | OXYGEN SATURATION: 97 % | SYSTOLIC BLOOD PRESSURE: 126 MMHG | TEMPERATURE: 97.4 F | RESPIRATION RATE: 18 BRPM | BODY MASS INDEX: 31.1 KG/M2 | HEIGHT: 69 IN

## 2019-05-24 DIAGNOSIS — S80.812A ABRASION OF LEFT LOWER LEG, INITIAL ENCOUNTER: ICD-10-CM

## 2019-05-24 DIAGNOSIS — S09.90XA INJURY OF HEAD, INITIAL ENCOUNTER: Primary | ICD-10-CM
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
## 2019-05-24 PROCEDURE — 99204 OFFICE O/P NEW MOD 45 MIN: CPT | Performed by: PHYSICIAN ASSISTANT

## 2019-06-01 ENCOUNTER — APPOINTMENT (OUTPATIENT)
Dept: URGENT CARE | Age: 24
End: 2019-06-01

## 2019-06-01 ENCOUNTER — APPOINTMENT (OUTPATIENT)
Dept: LAB | Age: 24
End: 2019-06-01

## 2019-06-01 ENCOUNTER — TRANSCRIBE ORDERS (OUTPATIENT)
Dept: URGENT CARE | Age: 24
End: 2019-06-01

## 2019-06-01 DIAGNOSIS — Z02.1 PHYSICAL EXAM, PRE-EMPLOYMENT: Primary | ICD-10-CM

## 2019-06-01 PROCEDURE — 36415 COLL VENOUS BLD VENIPUNCTURE: CPT

## 2019-06-01 PROCEDURE — 86480 TB TEST CELL IMMUN MEASURE: CPT

## 2019-06-01 PROCEDURE — 86787 VARICELLA-ZOSTER ANTIBODY: CPT

## 2019-06-03 ENCOUNTER — TELEPHONE (OUTPATIENT)
Dept: FAMILY MEDICINE CLINIC | Facility: CLINIC | Age: 24
End: 2019-06-03

## 2019-06-04 ENCOUNTER — TELEPHONE (OUTPATIENT)
Dept: FAMILY MEDICINE CLINIC | Facility: CLINIC | Age: 24
End: 2019-06-04

## 2019-06-04 ENCOUNTER — TELEPHONE (OUTPATIENT)
Dept: CARDIOLOGY CLINIC | Facility: CLINIC | Age: 24
End: 2019-06-04

## 2019-06-04 LAB — VZV IGG SER IA-ACNC: NORMAL

## 2019-06-05 LAB
GAMMA INTERFERON BACKGROUND BLD IA-ACNC: 0.04 IU/ML
M TB IFN-G BLD-IMP: NEGATIVE
M TB IFN-G CD4+ BCKGRND COR BLD-ACNC: -0.01 IU/ML
M TB IFN-G CD4+ BCKGRND COR BLD-ACNC: -0.02 IU/ML
MITOGEN IGNF BCKGRD COR BLD-ACNC: >10 IU/ML

## 2019-08-09 ENCOUNTER — TELEPHONE (OUTPATIENT)
Dept: CARDIOLOGY CLINIC | Facility: CLINIC | Age: 24
End: 2019-08-09

## 2019-08-09 ENCOUNTER — OFFICE VISIT (OUTPATIENT)
Dept: FAMILY MEDICINE CLINIC | Facility: CLINIC | Age: 24
End: 2019-08-09
Payer: COMMERCIAL

## 2019-08-09 DIAGNOSIS — R03.0 ELEVATED BLOOD PRESSURE READING: Primary | ICD-10-CM

## 2019-08-09 PROCEDURE — 99213 OFFICE O/P EST LOW 20 MIN: CPT | Performed by: FAMILY MEDICINE

## 2019-08-19 VITALS
DIASTOLIC BLOOD PRESSURE: 88 MMHG | SYSTOLIC BLOOD PRESSURE: 138 MMHG | HEART RATE: 83 BPM | TEMPERATURE: 97.5 F | WEIGHT: 213 LBS | RESPIRATION RATE: 18 BRPM | OXYGEN SATURATION: 97 % | BODY MASS INDEX: 31.55 KG/M2 | HEIGHT: 69 IN

## 2019-08-19 NOTE — PROGRESS NOTES
Assessment/Plan:  Blood pressure is elevated  We waited 10 min and blood pressure was able to come down slightly  Recommended home blood pressure readings  Recommend return to office for re-evaluation in 3-4 weeks with review of blood pressure readings to be done at that time  Recommended good diet and exercise  Low salt diet recommended  No problem-specific Assessment & Plan notes found for this encounter  Diagnoses and all orders for this visit:    Elevated blood pressure reading  -     24 hour blood pressure monitoring; Future    Other orders  -     ustekinumab (STELARA) 90 mg/mL subcutaneous injection; Inject 90 mg under the skin          Subjective:      Patient ID: Augie Chand is a 27 y o  male  Patient is here for suture removal   He is generally feeling well  Denies any issues but blood pressure is slightly elevated  Area of cut as well healed  The following portions of the patient's history were reviewed and updated as appropriate: allergies, current medications, past family history, past medical history, past social history, past surgical history and problem list     Review of Systems   Constitutional: Negative  HENT: Negative  Eyes: Negative  Respiratory: Negative  Cardiovascular: Negative  Gastrointestinal: Negative  Endocrine: Negative  Genitourinary: Negative  Musculoskeletal: Negative  Skin:        As noted in HPI   Allergic/Immunologic: Negative  Neurological: Negative  Hematological: Negative  Psychiatric/Behavioral: Negative  Objective:      /80 (BP Location: Left arm, Patient Position: Sitting, Cuff Size: Adult)   Pulse 83   Temp 97 5 °F (36 4 °C) (Tympanic)   Resp 18   Ht 5' 9" (1 753 m)   Wt 96 6 kg (213 lb)   SpO2 97% Comment: Room Air  BMI 31 45 kg/m²          Physical Exam   Constitutional: He is oriented to person, place, and time  He appears well-developed and well-nourished     HENT:   Head: Normocephalic and atraumatic  Right Ear: External ear normal  Tympanic membrane is not erythematous and not bulging  Left Ear: External ear normal  Tympanic membrane is not erythematous and not bulging  Nose: Nose normal    Mouth/Throat: Oropharynx is clear and moist and mucous membranes are normal  No oral lesions  No oropharyngeal exudate  Eyes: Conjunctivae and EOM are normal  Right eye exhibits no discharge  Left eye exhibits no discharge  No scleral icterus  Neck: Normal range of motion  Neck supple  No thyromegaly present  Cardiovascular: Normal rate, regular rhythm and normal heart sounds  Exam reveals no gallop and no friction rub  No murmur heard  Pulmonary/Chest: Effort normal  No respiratory distress  He has no wheezes  He has no rales  He exhibits no tenderness  Abdominal: Soft  Bowel sounds are normal  He exhibits no distension and no mass  There is no tenderness  There is no rebound and no guarding  Musculoskeletal: Normal range of motion  He exhibits no edema, tenderness or deformity  Lymphadenopathy:     He has no cervical adenopathy  Neurological: He is alert and oriented to person, place, and time  He has normal reflexes  No cranial nerve deficit  He exhibits normal muscle tone  Coordination normal    Skin: Skin is warm and dry  No rash noted  No erythema  No pallor  Wound appears to be well healed  Sutures were removed without complication today  Wound care instructions provided  Psychiatric: He has a normal mood and affect  His behavior is normal    Vitals reviewed

## 2019-08-28 ENCOUNTER — TELEPHONE (OUTPATIENT)
Dept: NEPHROLOGY | Facility: CLINIC | Age: 24
End: 2019-08-28

## 2019-08-28 NOTE — TELEPHONE ENCOUNTER
Patient no showed for ABPM put on for 08/28/19  I contacted the patients referring primary doctor and informed them patient had no showed  The office phone number is [phone]

## 2019-08-29 ENCOUNTER — OFFICE VISIT (OUTPATIENT)
Dept: CARDIOLOGY CLINIC | Facility: CLINIC | Age: 24
End: 2019-08-29
Payer: COMMERCIAL

## 2019-08-29 VITALS
DIASTOLIC BLOOD PRESSURE: 84 MMHG | BODY MASS INDEX: 30.36 KG/M2 | WEIGHT: 205 LBS | SYSTOLIC BLOOD PRESSURE: 138 MMHG | HEIGHT: 69 IN | HEART RATE: 72 BPM

## 2019-08-29 DIAGNOSIS — R03.0 ELEVATED BLOOD PRESSURE READING: ICD-10-CM

## 2019-08-29 DIAGNOSIS — R07.9 CHEST PAIN, UNSPECIFIED TYPE: Primary | ICD-10-CM

## 2019-08-29 DIAGNOSIS — R06.02 SHORTNESS OF BREATH: ICD-10-CM

## 2019-08-29 PROCEDURE — 93000 ELECTROCARDIOGRAM COMPLETE: CPT

## 2019-08-29 PROCEDURE — 99214 OFFICE O/P EST MOD 30 MIN: CPT

## 2019-08-29 NOTE — PROGRESS NOTES
Cardiology Office Note  Aparna Bronson  [address] [place], [address] Cardiology [place]  [address] [facility]  [place] [address] [phone]  [phone]  08/29/19    Referring Physian: Irma Woods, [address] [facility]  [place], [address] [facility]       Chief Complain/Reason for Referal:  Chest pain    IMPRESSION:  1  Atypical chest pain  2  Elevated blood pressure  3  History of Crohn's disease  4  Intermittent palpitations     DISCUSSION/RECOMMENDATIONS:  Patient still continues to have atypical chest pain type symptoms as well as now new symptoms of lightheadedness and palpitations  He has had a normal stress test and echocardiogram last year  My suspicion for intrinsic cardiac disease is very low  However, at this time given his palpitations lightheadedness will elect to place him on a 48 hour Holter monitor  Addition given his symptoms, and his elevated blood pressure readings that he has had in the past I agree with his family physician about evaluating further with a ambulatory blood pressure monitor  In addition to this and would like to check a TSH level as well to rule out hyperthyroid state  In follow-up after these tests are completed and further recommendations will be made at that time  ----------------------------------------------------------------------------------------------  PRIOR STRESS TEST: Duration of exercise was 12 min and 30 sec  Target heart rate was achieved  There was no chest pain during stress  -  ECG conclusions: The stress ECG was negative for ischemia  Based on [employer] Treadmill Scoring, this patient was at low risk for cardiac events  ECHO: EF 60%  Normal echo  Diagnoses and all orders for this visit:    Chest pain, unspecified type  -     POCT ECG  -     Holter monitor - 48 hour; Future  -     TSH, 3rd generation with Free T4 reflex    Shortness of breath  -     Holter monitor - 48 hour;  Future    Elevated blood pressure reading  -     TSH, 3rd generation with Free T4 reflex       ======================================================    HPI:  I am seeing this patient in cardiology consultation for:  atypical chest pain and palpitations    Yaz Wu is a 27 y o  male with a past medical history of atypical chest pain, Crohn's disease who presents today for follow-up  He had a stress test and echocardiogram last year which were normal   He still complains of the same with chest pain type feelings  He describes them as sharp pain located at the left border of his sternum  It is not worse with exertion  There are no exacerbating factors  He does also note episodes of lightheadedness and some mild palpitations that seem to be occurring daily  He is here for these complaints  Past Medical History:   Diagnosis Date    Anxiety     Resolved: 1/9/2018     Clostridium difficile colitis 2011    Crohn's disease ([facility] [place] [address] )     Epididymitis 12/21/2017    Resolved: 1/9/2018     Mass     Left neck    Pneumonia     x1    PONV (postoperative nausea and vomiting)     States this occurred while having colonoscopy or EGD and had to be suctioned    Seasonal allergies     Spermatocele     Wears contact lenses     Wears glasses          Medications  Current Outpatient Medications   Medication Sig Dispense Refill    multivitamin (THERAGRAN) TABS Take 1 tablet by mouth daily      Omega-3 Fatty Acids (FISH OIL PO) Take 2 capsules by mouth daily        ustekinumab (STELARA) 90 mg/mL subcutaneous injection Inject 90 mg under the skin      clonazePAM (KlonoPIN) 0 5 mg tablet Take 1 tablet (0 5 mg total) by mouth 2 (two) times a day (Patient not taking: Reported on 8/29/2019) 60 tablet 2     No current facility-administered medications for this visit           Allergies   Allergen Reactions    Penicillins Hives and Rash       Family History   Problem Relation Age of Onset    Anxiety disorder Mother     Crohn's disease Family     Ulcerative colitis Family Social History     Socioeconomic History    Marital status: Single     Spouse name: Not on file    Number of children: Not on file    Years of education: Not on file    Highest education level: Not on file   Occupational History    Occupation: PCA   Social Needs    Financial resource strain: Not on file    Food insecurity:     Worry: Not on file     Inability: Not on file    Transportation needs:     Medical: Not on file     Non-medical: Not on file   Tobacco Use    Smoking status: Never Smoker    Smokeless tobacco: Never Used   Substance and Sexual Activity    Alcohol use: Yes     Comment: 5 or 6 in a weekend    Drug use: No     Types: Marijuana     Comment: Pt stopped using    Sexual activity: Yes     Partners: Female   Lifestyle    Physical activity:     Days per week: Not on file     Minutes per session: Not on file    Stress: Not on file   Relationships    Social connections:     Talks on phone: Not on file     Gets together: Not on file     Attends Denominational service: Not on file     Active member of club or organization: Not on file     Attends meetings of clubs or organizations: Not on file     Relationship status: Not on file    Intimate partner violence:     Fear of current or ex partner: Not on file     Emotionally abused: Not on file     Physically abused: Not on file     Forced sexual activity: Not on file   Other Topics Concern    Not on file   Social History Narrative    Not on file         Review of Systems   Constitutional: Negative for chills and fever  HENT: Negative for facial swelling and sore throat  Eyes: Negative for visual disturbance  Respiratory: Negative for cough, chest tightness, shortness of breath and wheezing  Cardiovascular: Positive for chest pain and palpitations  Negative for leg swelling  Gastrointestinal: Negative for abdominal pain, blood in stool, constipation, diarrhea, nausea and vomiting     Endocrine: Negative for cold intolerance and heat intolerance  Genitourinary: Negative for decreased urine volume, difficulty urinating, dysuria and hematuria  Musculoskeletal: Negative for arthralgias, back pain and myalgias  Skin: Negative for rash  Neurological: Positive for dizziness  Negative for syncope, weakness and numbness  Psychiatric/Behavioral: Negative for agitation, behavioral problems and confusion  The patient is not nervous/anxious  Vitals:    08/29/19 1503   BP: 138/84   Pulse: 72       Physical Exam   General appearance: alert and oriented, in no acute distress  Head: Normocephalic, without obvious abnormality, atraumatic  Eyes: conjunctivae/corneas clear  Anicteric  Neck: no adenopathy, no carotid bruit, no JVD, supple, symmetrical, trachea midline and thyroid not enlarged, no tenderness  Lungs: clear to auscultation bilaterally  Heart: regular rate and rhythm, S1, S2 normal, no murmur, click, rub or gallop  Abdomen: soft, non-tender; bowel sounds normal; no masses,  no organomegaly  Extremities: extremities normal, warm and well-perfused; no cyanosis, clubbing, or edema    EKG:  Results for orders placed or performed in visit on 08/29/19   POCT ECG    Impression    Sinus rhythm at 62 beats per minute with mild sinus arrhythmia  Otherwise normal ECG          Lab Results   Component Value Date    WBC 7 16 06/21/2018    HGB 14 3 06/21/2018    HCT 44 0 06/21/2018    MCV 89 06/21/2018     06/21/2018      No results found for: SODIUM, K, CL, CO2, BUN, CREATININE, GLUC, CALCIUM   No results found for: HGBA1C   No results found for: CHOL  No results found for: HDL  No results found for: LDLCALC  No results found for: TRIG  No results found for: CHOLHDL   No results found for: INR, PROTIME       Patient Active Problem List    Diagnosis Date Noted    Elevated blood pressure reading 05/21/2019    Crohn's disease with complication ([facility] [address] ) 10/24/7031    Generalized anxiety disorder 06/22/2017    Vitamin D deficiency 04/24/2015    High risk medications (not anticoagulants) long-term use 06/30/2014       Portions of the record may have been created with voice recognition software  Occasional wrong word or "sound a like" substitutions may have occurred due to the inherent limitations of voice recognition software  Read the chart carefully and recognize, using context, where substitutions have occurred        Hallie James DO  8/29/2019 3:56 PM

## 2019-09-05 ENCOUNTER — APPOINTMENT (OUTPATIENT)
Dept: LAB | Age: 24
End: 2019-09-05
Payer: COMMERCIAL

## 2019-09-05 ENCOUNTER — HOSPITAL ENCOUNTER (OUTPATIENT)
Dept: NON INVASIVE DIAGNOSTICS | Facility: HOSPITAL | Age: 24
Discharge: HOME | End: 2019-09-05
Payer: COMMERCIAL

## 2019-09-05 DIAGNOSIS — R07.9 CHEST PAIN, UNSPECIFIED TYPE: ICD-10-CM

## 2019-09-05 DIAGNOSIS — R06.02 SHORTNESS OF BREATH: ICD-10-CM

## 2019-09-05 LAB — TSH SERPL DL<=0.05 MIU/L-ACNC: 0.95 UIU/ML (ref 0.36–3.74)

## 2019-09-05 PROCEDURE — 93225 XTRNL ECG REC<48 HRS REC: CPT

## 2019-09-05 PROCEDURE — 84443 ASSAY THYROID STIM HORMONE: CPT

## 2019-09-05 PROCEDURE — 93226 XTRNL ECG REC<48 HR SCAN A/R: CPT

## 2019-09-05 PROCEDURE — 36415 COLL VENOUS BLD VENIPUNCTURE: CPT

## 2019-09-09 ENCOUNTER — TELEPHONE (OUTPATIENT)
Dept: NEPHROLOGY | Facility: CLINIC | Age: 24
End: 2019-09-09

## 2019-09-10 NOTE — TELEPHONE ENCOUNTER
Spoke with the patient and he stated that he needs to look at his schedule and speak with the doctor then he will call the office to reschedule the appointment

## 2019-09-11 DIAGNOSIS — F41.1 GENERALIZED ANXIETY DISORDER: ICD-10-CM

## 2019-09-11 PROCEDURE — 93227 XTRNL ECG REC<48 HR R&I: CPT

## 2019-09-12 RX ORDER — CLONAZEPAM 0.5 MG/1
0.5 TABLET ORAL 2 TIMES DAILY
Qty: 60 TABLET | Refills: 2 | Status: SHIPPED | OUTPATIENT
Start: 2019-09-12 | End: 2019-12-12

## 2019-09-27 ENCOUNTER — OFFICE VISIT (OUTPATIENT)
Dept: CARDIOLOGY CLINIC | Facility: CLINIC | Age: 24
End: 2019-09-27
Payer: COMMERCIAL

## 2019-09-27 VITALS
BODY MASS INDEX: 30.21 KG/M2 | HEIGHT: 69 IN | WEIGHT: 204 LBS | SYSTOLIC BLOOD PRESSURE: 120 MMHG | OXYGEN SATURATION: 94 % | HEART RATE: 84 BPM | DIASTOLIC BLOOD PRESSURE: 70 MMHG

## 2019-09-27 DIAGNOSIS — R03.0 ELEVATED BLOOD PRESSURE READING: ICD-10-CM

## 2019-09-27 DIAGNOSIS — F41.1 GENERALIZED ANXIETY DISORDER: ICD-10-CM

## 2019-09-27 DIAGNOSIS — R07.89 ATYPICAL CHEST PAIN: Primary | ICD-10-CM

## 2019-09-27 PROCEDURE — 99214 OFFICE O/P EST MOD 30 MIN: CPT

## 2019-09-27 NOTE — PROGRESS NOTES
Cardiology   [address] [facility]  [address] [place], [place] [address]  [phone]  [phone]  09/27/19  Whit Salazar  YOB: 1995   MRN: [id number]      Referring Physian: Carmelita Mason, DO  [address]  [address]     HPI: Whit Salazar is a 27 y o  male with a past medical history of atypical chest pain type symptoms as well as  lightheadedness and palpitations  He has had a normal stress test and echocardiogram last year  he had a recent Holter monitor which negative for any significant arrhythmia  He presents today for follow-up    He still gets atypical chest pain here and there  He expressed to me today that he thinks it may be related to his increased stress      Review of Systems   Constitutional: Negative for chills and fever  HENT: Negative for facial swelling and sore throat  Eyes: Negative for visual disturbance  Respiratory: Negative for cough, chest tightness, shortness of breath and wheezing  Cardiovascular: Positive for chest pain  Negative for palpitations and leg swelling  Gastrointestinal: Negative for abdominal pain, blood in stool, constipation, diarrhea, nausea and vomiting  Endocrine: Negative for cold intolerance and heat intolerance  Genitourinary: Negative for decreased urine volume, difficulty urinating, dysuria and hematuria  Musculoskeletal: Negative for arthralgias, back pain and myalgias  Skin: Negative for rash  Neurological: Negative for dizziness, syncope, weakness and numbness  Psychiatric/Behavioral: Negative for agitation, behavioral problems and confusion  The patient is nervous/anxious  OBJECTIVE  Vitals:    09/27/19 1413   BP: 120/70   Pulse: 84   SpO2: 94%       Physical Exam   General appearance: alert and oriented, in no acute distress  Head: Normocephalic, without obvious abnormality, atraumatic  Eyes: conjunctivae/corneas clear  Anicteric  Neck: no adenopathy, no carotid bruit, no JVD  Lungs: clear to auscultation bilaterally  Heart: regular rate and rhythm, S1, S2 normal, no murmur, click, rub or gallop  Abdomen: soft, non-tender; bowel sounds normal; no masses,  no organomegaly  Extremities: extremities normal, warm and well-perfused; no cyanosis, clubbing, or edema  Skin: Skin color, texture, turgor normal  No rashes or lesions     EKG:  No results found for this visit on 19  IMPRESSION:  1  Atypical chest pain  2  History of Elevated blood pressure, normal today  3  History of Crohn's disease  4  Intermittent palpitations     DISCUSSION/RECOMMENDATIONS:  Patient has had a normal echocardiogram, normal stress test, and now normal Holter monitor  I have a low suspicion for intrinsic cardiac disease as a cause of symptoms  I suspect they may be related to his increased level of stress  I discussed with him today the benefits of speaking to a therapist in the professional setting, and the importance of mind-body health  His blood pressure today was normal on initial screening, and I personally measured this again myself, and I measured 120/84  He is set up for ambulatory blood pressure monitor through his primary care physician  Gabriela Hallman He will return to cardiology in 1 years time    --------------------------------------------------------------------------------  TREADMILL STRESS  Results for orders placed during the hospital encounter of 18   Stress test only, exercise    Narrative [address]  [address], [address] [facility]  [phone]    Stress Electrocardiography during Exercise    Name: Nikia Betts  MR #: [id number]  Account #: [de-identified]  Study date: 2018  : 1995  Age: 23 years  Gender: Male  Height: 70 in  Weight: 207 lb  BSA: 2 12 m squared    Allergies: PENICILLINS    Diagnosis: 785 1 - PALPITATIONS, R07 9 - Chest pain, unspecified    RN:  Agapito Armendariz, RN  Primary Physician:  Johnson Escalona DO  Referring Physician:  Issac Bhatti MD  Interpreting Physician:  Salvatore Cameron DO  Group:  [facility]  Cardiology Fellow:  Chito Zaldivar MD  Report Prepared By[de-identified]  Kiersten Préez  Technician:  Bill Thomas    CLINICAL QUESTION: Detection of coronary artery disease  HISTORY: The patient is a 26year old  male  Chest pain status: chest pain  Other symptoms: palpitations  Coronary artery disease risk factors: none  Cardiovascular history: none significant  Medications: no cardiac drugs  PHYSICAL EXAM: Baseline physical exam screening: no wheezes audible  REST ECG: Normal sinus rhythm  PROCEDURE: Treadmill exercise testing was performed, using the Andi protocol  Stress electrocardiographic evaluation was performed  ANDI PROTOCOL:  HR bpm SBP mmHg DBP mmHg Symptoms  Baseline 96 128 82 none  Stage 1 117 158 88 none  Stage 2 150 180 110 none  Stage 3 171 184 110 none  Stage 4 193 -- -- none  Stage 5 190 184 110 none  Recovery 1 134 160 80 none  Recovery 2 112 142 80 none  RESTING O2 SAT---98% PEAK O2 SAT---97% AT 12 MINUTES 30 SECONDS PATIENT ACCIDENTLY HIT "RED" STOP BUTTON THEREFORE TEST WAS COMPLETED AT THAT TIME  No medications or fluids given  STRESS SUMMARY: Duration of exercise was 12 min and 30 sec  The patient exercised to protocol stage 5  Maximal work rate was 13 9 METs  Maximal heart rate during stress was 196 bpm ( 98 % of maximal predicted heart rate)  The heart rate  response to stress was normal  There was normal resting blood pressure with an appropriate response to stress  The rate-pressure product for the peak heart rate and blood pressure was 42720  There was no chest pain during stress  The  stress test was terminated due to achievement of target heart rate  The stress ECG was negative for ischemia  There were no stress arrhythmias or conduction abnormalities   Based on [facility] Treadmill Scoring, this patient was at low risk for  cardiac events  SUMMARY:  -  Stress results: Duration of exercise was 12 min and 30 sec  Target heart rate was achieved  There was no chest pain during stress  -  ECG conclusions: The stress ECG was negative for ischemia  Based on [facility] Treadmill Scoring, this patient was at low risk for cardiac events  IMPRESSIONS: Normal study after maximal exercise without reproduction of symptoms  Prepared and signed by    Marjorie Hinson DO  Signed 2018 13:52:20        ----------------------------------------------------------------------------------------------  NUCLEAR STRESS TEST: No results found for this or any previous visit    No results found for this or any previous visit     --------------------------------------------------------------------------------  CATH:  No results found for this or any previous visit   --------------------------------------------------------------------------------  ECHO:   Results for orders placed during the hospital encounter of 18   Echo complete with contrast if indicated    N[facility]ative [facility] [place] [facility]  [facility], [address] [facility]  [phone]    Transthoracic Echocardiogram  2D, M-mode, Doppler, and Color Doppler    Study date:  27-Dec-2018    Patient: Nikia Betts  MR number: [id number]  Account number: [de-identified]  : 1995  Age: 26 years  Gender: Male  Status: Outpatient  Location: [address] [place] [address] [facility] [place]  Height: 70 in  Weight: 207 lb  BP: 130/ 70 mmHg    Indications: Chest pain    Diagnoses: R07 9 - Chest pain, unspecified    Sonographer:  ESSENCE Luna  Primary Physician:  Reg Woodruff DO  Referring Physician:  Jason Bar MD  Group:  [facility]  Cardiology Fellow:  Julio Cesar Verdin MD  Interpreting Physician:  Macrina Miller DO    SUMMARY    LEFT VENTRICLE:  Size was normal   Systolic function was normal  Ejection fraction was estimated to be 60 %  There were no regional wall motion abnormalities  There was no evidence of concentric hypertrophy  Left ventricular diastolic function parameters were normal     RIGHT VENTRICLE:  The size was normal   Systolic function was normal     TRICUSPID VALVE:  There was trace regurgitation  Pulmonary artery systolic pressure was within the normal range  HISTORY: PRIOR HISTORY: Crohn's disease    PROCEDURE: The study was performed in the [address]  This was a routine study  The transthoracic approach was used  The study included complete 2D imaging, M-mode, complete spectral Doppler, and color Doppler  Image  quality was adequate  LEFT VENTRICLE: Size was normal  Systolic function was normal  Ejection fraction was estimated to be 60 %  There were no regional wall motion abnormalities  Wall thickness was normal  There was no evidence of concentric hypertrophy  DOPPLER: Left ventricular diastolic function parameters were normal     RIGHT VENTRICLE: The size was normal  Systolic function was normal  Wall thickness was normal     LEFT ATRIUM: Size was within the limits of normal when indexed for body surface area  RIGHT ATRIUM: Size was normal     MITRAL VALVE: Valve structure was normal  There was normal leaflet separation  DOPPLER: The transmitral velocity was within the normal range  There was no evidence for stenosis  There was no significant regurgitation  AORTIC VALVE: The valve was trileaflet  Leaflets exhibited normal thickness and normal cuspal separation  DOPPLER: Transaortic velocity was within the normal range  There was no evidence for stenosis  There was no significant  regurgitation  TRICUSPID VALVE: The valve structure was normal  There was normal leaflet separation  DOPPLER: The transtricuspid velocity was within the normal range  There was no evidence for stenosis  There was trace regurgitation   Pulmonary artery  systolic pressure was within the normal range  Estimated peak PA pressure was 25 mmHg  PULMONIC VALVE: Leaflets exhibited normal thickness, no calcification, and normal cuspal separation  DOPPLER: The transpulmonic velocity was within the normal range  There was no significant regurgitation  PERICARDIUM: There was no pericardial effusion  The pericardium was normal in appearance  AORTA: The root exhibited normal size  SYSTEMIC VEINS: IVC: The inferior vena cava was normal in size  Respirophasic changes were normal     SYSTEM MEASUREMENT TABLES    2D  %FS: 34 97 %  Ao Diam: 3 21 cm  EDV(Teich): 138 5 ml  EF(Cube): 72 5 %  EF(Teich): 63 74 %  ESV(Cube): 42 19 ml  ESV(Teich): 50 22 ml  IVSd: 0 91 cm  LA Area: 20 99 cm2  LA Diam: 3 59 cm  LVEDV MOD A4C: 153 48 ml  LVEF MOD A4C: 51 43 %  LVESV MOD A4C: 74 55 ml  LVIDd: 5 35 cm  LVIDs: 3 48 cm  LVLd A4C: 9 83 cm  LVLs A4C: 8 cm  LVPWd: 0 85 cm  RA Area: 19 6 cm2  RV Diam : 3 69 cm  SV MOD A4C: 78 93 ml  SV(Cube): 111 21 ml  SV(Teich): 88 28 ml    CW  TR Vmax: 2 33 m/s  TR maxP 67 mmHg    MM  TAPSE: 2 41 cm    PW  AVC: 349 78 ms  E': 0 12 m/s  E/E': 7 32  MV A Tonio: 0 56 m/s  MV Dec [place]: 4 21 m/s2  MV DecT: 208 43 ms  MV E Tonio: 0 88 m/s  MV E/A Ratio: 1 55    [facility]    Prepared and electronically signed by    Abraham Connolly DO  Signed 27-Dec-2018 15:17:29       No results found for this or any previous visit   --------------------------------------------------------------------------------  HOLTER  No results found for this or any previous visit  Results for orders placed during the hospital encounter of 19   Holter monitor - 48 hour    Narrative PT NAME: Whit Salazar  : 1995  AGE: 27 y o    GENDER: male  MRN: [id number]   PROCEDURE: Holter monitor - 48 hour        INDICATIONS: Palpitations      FINDINGS:    Holter monitoring revealed predominant sinus rhythm with an average heart   rate of 71 BPM, a minimum heart rate of 38 BPM, and a maximum heart rate   of 146 BPM     There were about 0 ventricular ectopic beats  There were about 4 supraventricular ectopic beats, mostly occurring as   single PAC's and late beats with no evidence of supraventricular   tachycardia, atrial fibrillation, or atrial flutter  There was no evidence of significant bradyarrhythmia or advanced heart   block  The longest R-R interval was 1 7 seconds  The patient's symptom diary reported several episodes of chest pain as   well as lightheadedness    All these episodes correlated with normal sinus   rhythm                --------------------------------------------------------------------------------  There are no diagnoses linked to this encounter    ======================================================    Past Medical History:   Diagnosis Date    Anxiety     Resolved: 1/9/2018     Clostridium difficile colitis 2011    Crohn's disease ([facility] [address] )     Epididymitis 12/21/2017    Resolved: 1/9/2018     Mass     Left neck    Pneumonia     x1    PONV (postoperative nausea and vomiting)     States this occurred while having colonoscopy or EGD and had to be suctioned    Seasonal allergies     Spermatocele     Wears contact lenses     Wears glasses      Past Surgical History:   Procedure Laterality Date    BOWEL RESECTION  2011    Small bowel    COLONOSCOPY      ESOPHAGOGASTRODUODENOSCOPY      ME BX/REMV,LYMPH NODE,DEEP CERV Left 10/16/2018    Procedure: DEEP NECK MASS/DEEP NODE EXCISION;  Surgeon: Renetta Cuellar DO;  Location: [facility];  Service: ENT         Medications  Current Outpatient Medications   Medication Sig Dispense Refill    multivitamin (THERAGRAN) TABS Take 1 tablet by mouth daily      Omega-3 Fatty Acids (FISH OIL PO) Take 2 capsules by mouth daily        ustekinumab (STELARA) 90 mg/mL subcutaneous injection Inject 90 mg under the skin      clonazePAM (KlonoPIN) 0 5 mg tablet TAKE 1 TABLET (0 5 MG TOTAL) BY MOUTH 2 (TWO) TIMES A DAY (Patient not taking: Reported on 9/27/2019) 60 tablet 2     No current facility-administered medications for this visit           Allergies   Allergen Reactions    Penicillins Hives and Rash       Social History     Socioeconomic History    Marital status: Single     Spouse name: Not on file    Number of children: Not on file    Years of education: Not on file    Highest education level: Not on file   Occupational History    Occupation: PCA   Social Needs    Financial resource strain: Not on file    Food insecurity:     Worry: Not on file     Inability: Not on file    Transportation needs:     Medical: Not on file     Non-medical: Not on file   Tobacco Use    Smoking status: Never Smoker    Smokeless tobacco: Never Used   Substance and Sexual Activity    Alcohol use: Yes     Comment: 5 or 6 in a weekend    Drug use: No     Types: Marijuana     Comment: Pt stopped using    Sexual activity: Yes     Partners: Female   Lifestyle    Physical activity:     Days per week: Not on file     Minutes per session: Not on file    Stress: Not on file   Relationships    Social connections:     Talks on phone: Not on file     Gets together: Not on file     Attends Quaker service: Not on file     Active member of club or organization: Not on file     Attends meetings of clubs or organizations: Not on file     Relationship status: Not on file    Intimate partner violence:     Fear of current or ex partner: Not on file     Emotionally abused: Not on file     Physically abused: Not on file     Forced sexual activity: Not on file   Other Topics Concern    Not on file   Social History Narrative    Not on file        Family History   Problem Relation Age of Onset    Anxiety disorder Mother     Crohn's disease Family     Ulcerative colitis Family        Lab Results   Component Value Date    WBC 7 16 06/21/2018    HGB 14 3 06/21/2018    HCT 44 0 06/21/2018    MCV 89 06/21/2018     06/21/2018      No results found for: SODIUM, K, CL, CO2, BUN, CREATININE, GLUC, CALCIUM   No results found for: HGBA1C   No results found for: CHOL  No results found for: HDL  No results found for: LDLCALC  No results found for: TRIG  No results found for: CHOLHDL   No results found for: INR, PROTIME       Patient Active Problem List    Diagnosis Date Noted    Elevated blood pressure reading 05/21/2019    [facility] 30/15/9262    Generalized anxiety disorder 06/22/2017    Vitamin D deficiency 04/24/2015    High risk medications (not anticoagulants) long-term use 06/30/2014       Portions of the record may have been created with voice recognition software  Occasional wrong word or "sound a like" substitutions may have occurred due to the inherent limitations of voice recognition software  Read the chart carefully and recognize, using context, where substitutions have occurred        Colletta Skipper, DO  9/27/2019 2:46 PM

## 2019-10-21 ENCOUNTER — TRANSCRIBE ORDERS (OUTPATIENT)
Dept: ADMINISTRATIVE | Facility: HOSPITAL | Age: 24
End: 2019-10-21

## 2019-10-21 ENCOUNTER — APPOINTMENT (OUTPATIENT)
Dept: LAB | Age: 24
End: 2019-10-21
Payer: COMMERCIAL

## 2019-10-21 DIAGNOSIS — K52.9 INFLAMMATORY BOWEL DISEASE: Primary | ICD-10-CM

## 2019-10-21 DIAGNOSIS — K52.9 INFLAMMATORY BOWEL DISEASE: ICD-10-CM

## 2019-10-21 LAB
ALBUMIN SERPL BCP-MCNC: 3.5 G/DL (ref 3.5–5)
ALP SERPL-CCNC: 98 U/L (ref 46–116)
ALT SERPL W P-5'-P-CCNC: 29 U/L (ref 12–78)
ANION GAP SERPL CALCULATED.3IONS-SCNC: 7 MMOL/L (ref 4–13)
AST SERPL W P-5'-P-CCNC: 24 U/L (ref 5–45)
BASOPHILS # BLD AUTO: 0.03 THOUSANDS/ΜL (ref 0–0.1)
BASOPHILS NFR BLD AUTO: 0 % (ref 0–1)
BILIRUB SERPL-MCNC: 0.55 MG/DL (ref 0.2–1)
BUN SERPL-MCNC: 13 MG/DL (ref 5–25)
CALCIUM SERPL-MCNC: 9.4 MG/DL (ref 8.3–10.1)
CHLORIDE SERPL-SCNC: 105 MMOL/L (ref 100–108)
CO2 SERPL-SCNC: 26 MMOL/L (ref 21–32)
CREAT SERPL-MCNC: 0.95 MG/DL (ref 0.6–1.3)
CRP SERPL QL: 27.4 MG/L
EOSINOPHIL # BLD AUTO: 0.07 THOUSAND/ΜL (ref 0–0.61)
EOSINOPHIL NFR BLD AUTO: 1 % (ref 0–6)
ERYTHROCYTE [DISTWIDTH] IN BLOOD BY AUTOMATED COUNT: 13.2 % (ref 11.6–15.1)
GFR SERPL CREATININE-BSD FRML MDRD: 113 ML/MIN/1.73SQ M
GLUCOSE SERPL-MCNC: 96 MG/DL (ref 65–140)
HCT VFR BLD AUTO: 42.8 % (ref 36.5–49.3)
HGB BLD-MCNC: 14 G/DL (ref 12–17)
IMM GRANULOCYTES # BLD AUTO: 0.02 THOUSAND/UL (ref 0–0.2)
IMM GRANULOCYTES NFR BLD AUTO: 0 % (ref 0–2)
LYMPHOCYTES # BLD AUTO: 1.59 THOUSANDS/ΜL (ref 0.6–4.47)
LYMPHOCYTES NFR BLD AUTO: 18 % (ref 14–44)
MCH RBC QN AUTO: 28.6 PG (ref 26.8–34.3)
MCHC RBC AUTO-ENTMCNC: 32.7 G/DL (ref 31.4–37.4)
MCV RBC AUTO: 88 FL (ref 82–98)
MONOCYTES # BLD AUTO: 0.83 THOUSAND/ΜL (ref 0.17–1.22)
MONOCYTES NFR BLD AUTO: 10 % (ref 4–12)
NEUTROPHILS # BLD AUTO: 6.13 THOUSANDS/ΜL (ref 1.85–7.62)
NEUTS SEG NFR BLD AUTO: 71 % (ref 43–75)
NRBC BLD AUTO-RTO: 0 /100 WBCS
PLATELET # BLD AUTO: 339 THOUSANDS/UL (ref 149–390)
PMV BLD AUTO: 11.7 FL (ref 8.9–12.7)
POTASSIUM SERPL-SCNC: 3.8 MMOL/L (ref 3.5–5.3)
PROT SERPL-MCNC: 8.8 G/DL (ref 6.4–8.2)
RBC # BLD AUTO: 4.89 MILLION/UL (ref 3.88–5.62)
SODIUM SERPL-SCNC: 138 MMOL/L (ref 136–145)
WBC # BLD AUTO: 8.67 THOUSAND/UL (ref 4.31–10.16)

## 2019-10-21 PROCEDURE — 85025 COMPLETE CBC W/AUTO DIFF WBC: CPT

## 2019-10-21 PROCEDURE — 86706 HEP B SURFACE ANTIBODY: CPT

## 2019-10-21 PROCEDURE — 80053 COMPREHEN METABOLIC PANEL: CPT

## 2019-10-21 PROCEDURE — 87340 HEPATITIS B SURFACE AG IA: CPT

## 2019-10-21 PROCEDURE — 86704 HEP B CORE ANTIBODY TOTAL: CPT

## 2019-10-21 PROCEDURE — 86708 HEPATITIS A ANTIBODY: CPT

## 2019-10-21 PROCEDURE — 86140 C-REACTIVE PROTEIN: CPT

## 2019-10-21 PROCEDURE — 36415 COLL VENOUS BLD VENIPUNCTURE: CPT

## 2019-10-22 LAB
HAV AB SER QL IA: REACTIVE
HBV CORE AB SER QL: NORMAL
HBV SURFACE AB SER-ACNC: 10.67 MIU/ML
HBV SURFACE AG SER QL: NORMAL

## 2019-11-14 ENCOUNTER — APPOINTMENT (OUTPATIENT)
Dept: LAB | Age: 24
End: 2019-11-14
Payer: COMMERCIAL

## 2019-11-14 DIAGNOSIS — K52.9 INFLAMMATORY BOWEL DISEASE: ICD-10-CM

## 2019-11-14 PROCEDURE — 36415 COLL VENOUS BLD VENIPUNCTURE: CPT

## 2019-11-14 PROCEDURE — 82397 CHEMILUMINESCENT ASSAY: CPT

## 2019-11-14 PROCEDURE — 80299 QUANTITATIVE ASSAY DRUG: CPT

## 2019-11-25 LAB — MISCELLANEOUS LAB TEST RESULT: NORMAL

## 2019-12-03 ENCOUNTER — TELEPHONE (OUTPATIENT)
Dept: FAMILY MEDICINE CLINIC | Facility: CLINIC | Age: 24
End: 2019-12-03

## 2019-12-03 DIAGNOSIS — K50.919 CROHN'S DISEASE WITH COMPLICATION, UNSPECIFIED GASTROINTESTINAL TRACT LOCATION (HCC): Primary | ICD-10-CM

## 2019-12-03 NOTE — TELEPHONE ENCOUNTER
Is patient looking for a referral for Infectious Disease here in the [facility]?   I would recommend [facility] with Dr Raejean Lundborg

## 2019-12-03 NOTE — TELEPHONE ENCOUNTER
Took call from patient requesting a referral for infectious diease requested by Dr Rolf Oneal from [address]   Patient would like a doctor you recommend and trust      Please advise

## 2019-12-03 NOTE — TELEPHONE ENCOUNTER
Spoke to pt - he is requesting that you put in a doctor to doctor referral for [facility]  Pt would like a phone call when done  Please advise

## 2019-12-04 ENCOUNTER — TELEPHONE (OUTPATIENT)
Dept: INFECTIOUS DISEASES | Facility: CLINIC | Age: 24
End: 2019-12-04

## 2019-12-04 NOTE — TELEPHONE ENCOUNTER
Pt referred by Dr Ayala Aldrich but the referral reason is unclear  Pt has Crohns but there is no indication of an infection in the records  I left VM for pt to call back to discuss

## 2019-12-12 ENCOUNTER — OFFICE VISIT (OUTPATIENT)
Dept: FAMILY MEDICINE CLINIC | Facility: CLINIC | Age: 24
End: 2019-12-12

## 2019-12-12 VITALS
OXYGEN SATURATION: 97 % | DIASTOLIC BLOOD PRESSURE: 88 MMHG | HEIGHT: 69 IN | HEART RATE: 82 BPM | BODY MASS INDEX: 28.14 KG/M2 | TEMPERATURE: 97.4 F | SYSTOLIC BLOOD PRESSURE: 138 MMHG | WEIGHT: 190 LBS

## 2019-12-12 DIAGNOSIS — J01.00 ACUTE NON-RECURRENT MAXILLARY SINUSITIS: Primary | ICD-10-CM

## 2019-12-12 PROCEDURE — 3008F BODY MASS INDEX DOCD: CPT | Performed by: FAMILY MEDICINE

## 2019-12-12 PROCEDURE — 99213 OFFICE O/P EST LOW 20 MIN: CPT | Performed by: FAMILY MEDICINE

## 2019-12-12 RX ORDER — IPRATROPIUM BROMIDE 21 UG/1
2 SPRAY, METERED NASAL EVERY 12 HOURS
Qty: 30 ML | Refills: 0 | Status: SHIPPED | OUTPATIENT
Start: 2019-12-12 | End: 2020-06-26

## 2019-12-12 RX ORDER — AZITHROMYCIN 250 MG/1
TABLET, FILM COATED ORAL
Qty: 6 TABLET | Refills: 0 | Status: SHIPPED | OUTPATIENT
Start: 2019-12-12 | End: 2019-12-19

## 2019-12-12 NOTE — PROGRESS NOTES
Assessment/Plan:  Side effect profile medication reviewed  Recommend return to office for recheck if no improvement or worsening symptoms  No problem-specific Assessment & Plan notes found for this encounter  Diagnoses and all orders for this visit:    Acute non-recurrent maxillary sinusitis  -     azithromycin (ZITHROMAX) 250 mg tablet; Take 2 tablets today then 1 tablet daily x 4 days  -     ipratropium (ATROVENT) 0 03 % nasal spray; 2 sprays into each nostril every 12 (twelve) hours          Subjective:      Patient ID: Rosa Magaña is a 27 y o  male  Patient with sinus pressure and congestion over the last 1 week  Symptoms are mild-to-moderate  No GI complaints  He also has dry nonproductive cough  The following portions of the patient's history were reviewed and updated as appropriate: allergies, current medications, past family history, past medical history, past social history, past surgical history and problem list     Review of Systems   Constitutional: Negative  HENT: Negative  Eyes: Negative  Respiratory: Negative  Cardiovascular: Negative  Gastrointestinal: Negative  Endocrine: Negative  Genitourinary: Negative  Musculoskeletal: Negative  Skin: Negative  Allergic/Immunologic: Negative  Neurological: Negative  Hematological: Negative  Psychiatric/Behavioral: Negative  Objective:      /88 (BP Location: Left arm, Patient Position: Sitting, Cuff Size: Standard)   Pulse 82   Temp (!) 97 4 °F (36 3 °C) (Tympanic)   Ht 5' 8 9" (1 75 m)   Wt 86 2 kg (190 lb)   SpO2 97%   BMI 28 14 kg/m²          Physical Exam   Constitutional: He is oriented to person, place, and time  He appears well-developed and well-nourished  HENT:   Head: Normocephalic and atraumatic  Right Ear: External ear normal  Tympanic membrane is not erythematous and not bulging     Left Ear: External ear normal  Tympanic membrane is not erythematous and not bulging  Nose: Nose normal    Mouth/Throat: Oropharynx is clear and moist and mucous membranes are normal  No oral lesions  No oropharyngeal exudate  Eyes: Conjunctivae and EOM are normal  Right eye exhibits no discharge  Left eye exhibits no discharge  No scleral icterus  Neck: Normal range of motion  Neck supple  No thyromegaly present  Cardiovascular: Normal rate, regular rhythm and normal heart sounds  Exam reveals no gallop and no friction rub  No murmur heard  Pulmonary/Chest: Effort normal  No respiratory distress  He has no wheezes  He has no rales  He exhibits no tenderness  Abdominal: Soft  Bowel sounds are normal  He exhibits no distension and no mass  There is no tenderness  There is no rebound and no guarding  Musculoskeletal: Normal range of motion  He exhibits no edema, tenderness or deformity  Lymphadenopathy:     He has no cervical adenopathy  Neurological: He is alert and oriented to person, place, and time  He has normal reflexes  No cranial nerve deficit  He exhibits normal muscle tone  Coordination normal    Skin: Skin is warm and dry  No rash noted  No erythema  No pallor  Psychiatric: He has a normal mood and affect  His behavior is normal    Vitals reviewed

## 2019-12-17 ENCOUNTER — TRANSCRIBE ORDERS (OUTPATIENT)
Dept: ADMINISTRATIVE | Facility: HOSPITAL | Age: 24
End: 2019-12-17

## 2019-12-17 DIAGNOSIS — K50.919 CROHN'S DISEASE WITH COMPLICATION, UNSPECIFIED GASTROINTESTINAL TRACT LOCATION (HCC): Primary | ICD-10-CM

## 2019-12-18 ENCOUNTER — OFFICE VISIT (OUTPATIENT)
Dept: INFECTIOUS DISEASES | Facility: CLINIC | Age: 24
End: 2019-12-18
Payer: COMMERCIAL

## 2019-12-18 ENCOUNTER — APPOINTMENT (OUTPATIENT)
Dept: LAB | Facility: HOSPITAL | Age: 24
End: 2019-12-18
Attending: INTERNAL MEDICINE
Payer: COMMERCIAL

## 2019-12-18 VITALS
BODY MASS INDEX: 27.99 KG/M2 | DIASTOLIC BLOOD PRESSURE: 78 MMHG | RESPIRATION RATE: 16 BRPM | HEART RATE: 84 BPM | HEIGHT: 69 IN | TEMPERATURE: 98.1 F | SYSTOLIC BLOOD PRESSURE: 130 MMHG | WEIGHT: 189 LBS

## 2019-12-18 DIAGNOSIS — Z72.51 UNPROTECTED SEX: ICD-10-CM

## 2019-12-18 DIAGNOSIS — B25.9 CYTOMEGALOVIRUS INFECTION, UNSPECIFIED CYTOMEGALOVIRAL INFECTION TYPE (HCC): Primary | ICD-10-CM

## 2019-12-18 DIAGNOSIS — D84.821 IMMUNOSUPPRESSION DUE TO DRUG THERAPY (HCC): ICD-10-CM

## 2019-12-18 DIAGNOSIS — Z79.899 IMMUNOSUPPRESSION DUE TO DRUG THERAPY (HCC): ICD-10-CM

## 2019-12-18 DIAGNOSIS — K50.919 CROHN'S DISEASE WITH COMPLICATION, UNSPECIFIED GASTROINTESTINAL TRACT LOCATION (HCC): ICD-10-CM

## 2019-12-18 PROCEDURE — 99243 OFF/OP CNSLTJ NEW/EST LOW 30: CPT | Performed by: INTERNAL MEDICINE

## 2019-12-18 PROCEDURE — 36415 COLL VENOUS BLD VENIPUNCTURE: CPT

## 2019-12-18 PROCEDURE — 87389 HIV-1 AG W/HIV-1&-2 AB AG IA: CPT

## 2019-12-18 NOTE — LETTER
December 18, 2019     Chapin Dunbar 52    Patient: Pablo Lobo   YOB: 1995   Date of Visit: 12/18/2019       Dear Dr Aundrea Chavarria: Thank you for referring Pablo Lobo to me for evaluation  Below are my notes for this consultation  If you have questions, please do not hesitate to call me  I look forward to following your patient along with you  Sincerely,        Cory Young MD        CC: MD Cory Ng MD  12/18/2019  8:46 AM  Sign at close encounter  Consultation - Infectious Disease   Pablo Lobo 27 y o  male MRN: [id number]  Unit/Bed#:  Encounter: [id number]      IMPRESSION & RECOMMENDATIONS:   1  Biopsy of colon positive for CMV  Patient presents for evaluation in terms of treatment recommendations for CMV documented on histological staining of colon biopsy  Reviewed current literature and references below with the patient, provided copies  Based on current literature would not recommend treatment of CMV in the setting of Crohn's disease as there is no clear benefit and antiviral therapy for CMV has significant toxicity  Patient overall clinically doing well in terms of symptoms  Additionally of reviewed patient's biologic therapy drug trials and there is no increased risk to CMV disease for Crohn's patient's on this drug  No antiviral therapy is recommended at this time  Continue patient on biologic as per Gastroenterology  Continue frequent clinical monitoring and symptom management  Continue regular HIV/Hep/TB testing as recommended for ustekinumab  Patient should continue to follow with his primary otherwise  No formal follow-up recommended in our office  2  Crohn's disease with multiple complications  Patient has had multiple complications Crohn's disease  Remains on biologic therapy at this time    Recommend continued close follow-up in regular colonoscopies as per GI       3  Immunosuppression due to drug therapy  Patient chronically on biologic therapy as above  Reviewed in detail the minimal infection risk associated with this therapy particularly  Reviewed previous Infectious Disease testing  Recommended continued regular screening as above  No requirement for prophylactic antibiotic  Discussed avoiding uncooked foods, washing fruits and vegetables, and good hand hygiene     4  Reported unprotected sex  Discussing infection risk from current therapy patient did mention having an episode of unprotected sex recently  He has requested HIV screening at this time  HIV antigen/antibody test ordered  Will contact patient with results negative or positive  Additional care as above    Plan discussed in detail with the patient  Will forward office visit to PCP/gastroenterology  RTO PRN      References:  Harleen garrison  Https://www ncbi nlm nih gov/pmc/articles/[email or web address]/  https://www [email or web address]  net/archives/[email or web address]-2012/cytomegalovirus-complicating-inflammatory-bowel-disease-a-10-yearexperience-in-a-[facility]/  [email or web address] [employer]/    HISTORY OF PRESENT ILLNESS:  Reason for Consult:  Biopsy with CMV    HPI: Mandi Posada is a 27y o  year old male with past medical history significant for Crohn's disease, previous C diff, epididymitis, prior bowel resection  Patient presents to our office for evaluation by his gastroenterologist as he had screening colonoscopy in the setting of Crohn's disease done about 3 weeks ago and biopsy came back positive for staining for CMV  Biopsy additionally showed non-necrotizing granulomas and ulcerations consistent with Crohn's disease  Patient has had Crohn's disease since he was 12years old and has primarily been on biologic agents    He reports mild symptoms in terms of intermittent cramping and intermittent blood in his stools which is overall decline since starting his new biologic in June  He unfortunately relapsed on his previous biologic therapy  He is on no other controller medications  He is also not on any steroids  He is frequently screened for hepatitis along with TB which testing has recently been negative  He reports previous HIV testing was negative however this is not documented in our chart or in Care everywhere  He denies having any non purposeful weight loss and overall appetite is good  He is not actively on other chemotherapeutic send is not a transplant patient  He does not appear to have other immunocompromising conditions  He has no other new complaints at this time  On questioning about his previous screenings the patient did mention having unprotected sex in the past and did request having HIV screening since he is at our office  He plans to follow-up with his gastroenterologist regularly  Outside records reviewed from patient's gastroenterologist     REVIEW OF SYSTEMS:  A complete 12 point system-based review of systems is otherwise negative      PAST MEDICAL HISTORY:  Past Medical History:   Diagnosis Date    Anxiety     Resolved: 1/9/2018     Clostridium difficile colitis 2011    Crohn's disease ([facility] [address] )     Epididymitis 12/21/2017    Resolved: 1/9/2018     Mass     Left neck    Pneumonia     x1    PONV (postoperative nausea and vomiting)     States this occurred while having colonoscopy or EGD and had to be suctioned    Seasonal allergies     Spermatocele     Wears contact lenses     Wears glasses      Past Surgical History:   Procedure Laterality Date    BOWEL RESECTION  2011    Small bowel    COLONOSCOPY      ESOPHAGOGASTRODUODENOSCOPY      [place] BX/REMV,LYMPH NODE,DEEP CERV Left 10/16/2018    Procedure: DEEP NECK MASS/DEEP NODE EXCISION;  Surgeon: Thomas Hall DO;  Location: [facility];  Service: ENT       FAMILY HISTORY:  Non-contributory    SOCIAL HISTORY:  Social History   Social History     Substance and Sexual Activity   Alcohol Use Yes    Comment: 5 or 6 in a weekend     Social History     Substance and Sexual Activity   Drug Use No    Comment: Pt stopped using     Social History     Tobacco Use   Smoking Status Never Smoker   Smokeless Tobacco Never Used       ALLERGIES:  Allergies   Allergen Reactions    Penicillins Hives and Rash       MEDICATIONS:  All current active medications have been reviewed  Antibiotics:  None    PHYSICAL EXAM:  Vitals:    12/18/19 0759   BP: 130/78   Pulse: 84   Resp: 16   Temp: 98 1 °F (36 7 °C)   Weight: 85 7 kg (189 lb)   Height: 5' 9" (1 753 m)         General Appearance:  Appearing well, nontoxic, and in no distress   Head:  Normocephalic, without obvious abnormality, atraumatic   Eyes:  Conjunctiva pink and sclera anicteric, both eyes   Nose: Nares normal, mucosa normal, no drainage   Throat: Oropharynx moist without lesions   Neck: Supple, symmetrical, no adenopathy, no tenderness/mass/nodules   Back:   Symmetric, no curvature, ROM normal, no CVA tenderness; no spinal or paraspinal muscle tenderness to palpation  Lungs:   Clear to auscultation bilaterally, respirations unlabored on room air   Chest Wall:  No tenderness or deformity   Heart:  RRR; no murmur, rub or gallop   Abdomen:   Soft, non-tender, non-distended, positive bowel sounds, I am unable to re-create any abdominal discomfort   Extremities: No cyanosis, clubbing or edema   Skin: No rashes or lesions  No draining wounds noted  Lymph nodes: Cervical, supraclavicular nodes normal   Neurologic: Alert and oriented times 3, extremity strength 5/5 and symmetric       LABS, IMAGING, & OTHER STUDIES:  Lab Results:  I have personally reviewed pertinent labs    Lab Results   Component Value Date    K 3 8 10/21/2019     10/21/2019    CO2 26 10/21/2019    BUN 13 10/21/2019    CREATININE 0 95 10/21/2019    CALCIUM 9 4 10/21/2019    AST 24 10/21/2019    ALT 29 10/21/2019    ALKPHOS 98 10/21/2019    EGFR 113 10/21/2019     Lab Results   Component Value Date    WBC 8 67 10/21/2019    HGB 14 0 10/21/2019    HCT 42 8 10/21/2019    MCV 88 10/21/2019     10/21/2019   No results found for: SEDRATE      Imaging Studies:   I have personally reviewed pertinent imaging study reports and images in PACS  Other Studies:   I have personally reviewed pertinent reports

## 2019-12-18 NOTE — PATIENT INSTRUCTIONS
You have not been recommended for antiviral therapy at this time  References provided for further reading  [id number] office visit will be forwarded to your gastroenterologist    Should any questions arise please do not hesitate to contact our office  No formal follow-up provided at this time

## 2019-12-18 NOTE — PROGRESS NOTES
Consultation - Infectious Disease   Sonja Courtney 27 y o  male MRN: [id number]  Unit/Bed#:  Encounter: [id number]      IMPRESSION & RECOMMENDATIONS:   1  Biopsy of colon positive for CMV  Patient presents for evaluation in terms of treatment recommendations for CMV documented on histological staining of colon biopsy  Reviewed current literature and references below with the patient, provided copies  Based on current literature would not recommend treatment of CMV in the setting of Crohn's disease as there is no clear benefit and antiviral therapy for CMV has significant toxicity  Patient overall clinically doing well in terms of symptoms  Additionally of reviewed patient's biologic therapy drug trials and there is no increased risk to CMV disease for Crohn's patient's on this drug  No antiviral therapy is recommended at this time  Continue patient on biologic as per Gastroenterology  Continue frequent clinical monitoring and symptom management  Continue regular HIV/Hep/TB testing as recommended for ustekinumab  Patient should continue to follow with his primary otherwise  No formal follow-up recommended in our office  2  Crohn's disease with multiple complications  Patient has had multiple complications Crohn's disease  Remains on biologic therapy at this time  Recommend continued close follow-up in regular colonoscopies as per GI       3  Immunosuppression due to drug therapy  Patient chronically on biologic therapy as above  Reviewed in detail the minimal infection risk associated with this therapy particularly  Reviewed previous Infectious Disease testing  Recommended continued regular screening as above  No requirement for prophylactic antibiotic  Discussed avoiding uncooked foods, washing fruits and vegetables, and good hand hygiene     4  Reported unprotected sex  Discussing infection risk from current therapy patient did mention having an episode of unprotected sex recently    He has requested HIV screening at this time  HIV antigen/antibody test ordered  Will contact patient with results negative or positive  Additional care as above    Plan discussed in detail with the patient  Will forward office visit to PCP/gastroenterology  RTO PRN      References:  Harleen garrison  Https://www ncbi nlm nih gov/pmc/articles/[email or web address]/  https://www gastroenterologyandhepatology  net/archives/april-2012/cytomegalovirus-complicating-inflammatory-bowel-disease-a-10-yearexperience-in-a-[facility]/  http://[employer]/    HISTORY OF PRESENT ILLNESS:  Reason for Consult:  Biopsy with CMV    HPI: Cali Deleon is a 27y o  year old male with past medical history significant for Crohn's disease, previous C diff, epididymitis, prior bowel resection  Patient presents to our office for evaluation by his gastroenterologist as he had screening colonoscopy in the setting of Crohn's disease done about 3 weeks ago and biopsy came back positive for staining for CMV  Biopsy additionally showed non-necrotizing granulomas and ulcerations consistent with Crohn's disease  Patient has had Crohn's disease since he was 12years old and has primarily been on biologic agents  He reports mild symptoms in terms of intermittent cramping and intermittent blood in his stools which is overall decline since starting his new biologic in June  He unfortunately relapsed on his previous biologic therapy  He is on no other controller medications  He is also not on any steroids  He is frequently screened for hepatitis along with TB which testing has recently been negative  He reports previous HIV testing was negative however this is not documented in our chart or in Care everywhere  He denies having any non purposeful weight loss and overall appetite is good    He is not actively on other chemotherapeutic send is not a transplant patient  He does not appear to have other immunocompromising conditions  He has no other new complaints at this time  On questioning about his previous screenings the patient did mention having unprotected sex in the past and did request having HIV screening since he is at our office  He plans to follow-up with his gastroenterologist regularly  Outside records reviewed from patient's gastroenterologist     REVIEW OF SYSTEMS:  A complete 12 point system-based review of systems is otherwise negative  PAST MEDICAL HISTORY:  Past Medical History:   Diagnosis Date    Anxiety     Resolved: 1/9/2018     Clostridium difficile colitis 2011    Cro[facility]'s disease ([facility] [address] )     Epididymitis 12/21/2017    Resolved: 1/9/2018     Mass     Left neck    Pneumonia     x1    PONV (postoperative nausea and vomiting)     States this occurred while having colonoscopy or EGD and had to be suctioned    Seasonal allergies     Spermatocele     Wears contact lenses     Wears glasses      Past Surgical History:   Procedure Laterality Date    BOWEL RESECTION  2011    Small bowel    COLONOSCOPY      ESOPHAGOGASTRODUODENOSCOPY      [place] BX/REMV,LYMPH NODE,DEEP CERV Left 10/16/2018    Procedure: DEEP NECK MASS/DEEP NODE EXCISION;  Surgeon: Garrick Rosen DO;  Location: [facility];  Service: ENT       FAMILY HISTORY:  Non-contributory    SOCIAL HISTORY:  Social History   Social History     Substance and Sexual Activity   Alcohol Use Yes    Comment: 5 or 6 in a weekend     Social History     Substance and Sexual Activity   Drug Use No    Comment: Pt stopped using     Social History     Tobacco Use   Smoking Status Never Smoker   Smokeless Tobacco Never Used       ALLERGIES:  Allergies   Allergen Reactions    Penicillins Hives and Rash       MEDICATIONS:  All current active medications have been reviewed    Antibiotics:  None    PHYSICAL EXAM:  Vitals:    12/18/19 0759   BP: 130/78   Pulse: 84   Resp: 16   Temp: 98 1 °F (36 7 °C)   Weight: 85 7 kg (189 lb)   Height: 5' 9" (1 753 m)         General Appearance:  Appearing well, nontoxic, and in no distress   Head:  Normocephalic, without obvious abnormality, atraumatic   Eyes:  Conjunctiva pink and sclera anicteric, both eyes   Nose: Nares normal, mucosa normal, no drainage   Throat: Oropharynx moist without lesions   Neck: Supple, symmetrical, no adenopathy, no tenderness/mass/nodules   Back:   Symmetric, no curvature, ROM normal, no CVA tenderness; no spinal or paraspinal muscle tenderness to palpation  Lungs:   Clear to auscultation bilaterally, respirations unlabored on room air   Chest Wall:  No tenderness or deformity   Heart:  RRR; no murmur, rub or gallop   Abdomen:   Soft, non-tender, non-distended, positive bowel sounds, I am unable to re-create any abdominal discomfort   Extremities: No cyanosis, clubbing or edema   Skin: No rashes or lesions  No draining wounds noted  Lymph nodes: Cervical, supraclavicular nodes normal   Neurologic: Alert and oriented times 3, extremity strength 5/5 and symmetric       LABS, IMAGING, & OTHER STUDIES:  Lab Results:  I have personally reviewed pertinent labs  Lab Results   Component Value Date    K 3 8 10/21/2019     10/21/2019    CO2 26 10/21/2019    BUN 13 10/21/2019    CREATININE 0 95 10/21/2019    CALCIUM 9 4 10/21/2019    AST 24 10/21/2019    ALT 29 10/21/2019    ALKPHOS 98 10/21/2019    EGFR 113 10/21/2019     Lab Results   Component Value Date    WBC 8 67 10/21/2019    HGB 14 0 10/21/2019    HCT 42 8 10/21/2019    MCV 88 10/21/2019     10/21/2019   No results found for: SEDRATE      Imaging Studies:   I have personally reviewed pertinent imaging study reports and images in PACS  Other Studies:   I have personally reviewed pertinent reports

## 2019-12-19 ENCOUNTER — TELEPHONE (OUTPATIENT)
Dept: INFECTIOUS DISEASES | Facility: CLINIC | Age: 24
End: 2019-12-19

## 2019-12-19 LAB — HIV 1+2 AB+HIV1 P24 AG SERPL QL IA: NORMAL

## 2019-12-19 NOTE — TELEPHONE ENCOUNTER
I contacted this pt and he identified himself and I informed him of his negative test results  Pt thanked me for the call

## 2020-01-15 ENCOUNTER — CLINICAL SUPPORT (OUTPATIENT)
Dept: NEPHROLOGY | Facility: CLINIC | Age: 25
End: 2020-01-15

## 2020-01-15 VITALS
HEIGHT: 69 IN | BODY MASS INDEX: 28.2 KG/M2 | DIASTOLIC BLOOD PRESSURE: 86 MMHG | SYSTOLIC BLOOD PRESSURE: 126 MMHG | WEIGHT: 190.4 LBS | HEART RATE: 75 BPM | RESPIRATION RATE: 18 BRPM

## 2020-01-15 DIAGNOSIS — I10 WHITE COAT SYNDROME WITH HYPERTENSION: Primary | ICD-10-CM

## 2020-01-15 PROCEDURE — PBNCHG PB NO CHARGE PLACEHOLDER

## 2020-01-15 NOTE — PATIENT INSTRUCTIONS
Patient/parent/guardian briefed on responsibility form for sake keeping of ABPM machine and equipment  Patient/parent/guardian signed responsibility form  Patient/parent/guardian briefed on instructions for ABPM use and BP log use and documentation  Patient/parent/guardian verbally acknowledged understanding all instructions

## 2020-01-16 ENCOUNTER — CLINICAL SUPPORT (OUTPATIENT)
Dept: NEPHROLOGY | Facility: CLINIC | Age: 25
End: 2020-01-16
Payer: COMMERCIAL

## 2020-01-16 DIAGNOSIS — I10 WHITE COAT SYNDROME WITH HYPERTENSION: Primary | ICD-10-CM

## 2020-01-16 PROCEDURE — 93784 AMBL BP MNTR W/SOFTWARE: CPT

## 2020-01-17 PROBLEM — I10 WHITE COAT SYNDROME WITH HYPERTENSION: Status: ACTIVE | Noted: 2020-01-17

## 2020-01-17 NOTE — PROGRESS NOTES
I have had the pleasure of interpreting a 24 hour ambulatory blood pressure monitor report performed on Valentino Miguel from 01/15/2020 to 01/16/2020  There were 49 out of 58 successful readings obtained during that time (84 48% successful readings)  Of note, the patient had no obvious symptoms listed on his diary  The results were as follows: This was an optimal study due to adequate time of monitoring  Magui Carroll average blood pressure reading was 116/67 with a heart rate of 68  The range was a minimum of 93/47 mm Hg and a maximum of 142/92 mm Hg  The Daytime average blood pressure reading was 122/73 mm Hg with a heart rate of 74, with 5 96% of the systolic readings greater than 135 mm Hg and 18 91% of the diastolic readings greater than 85 mm Hg  There was a insignificant  systolic daytime blood pressure load and insignificant  diastolic daytime blood pressure load  (>20% is significant)    The Nighttime average blood pressure reading was 101/54 with a heart rate of 54, with 6 86% of the systolic readings greater than 120 mm Hg and 8 58% of the diastolic readings greater than 70 mm Hg  There was insignificant  systolic night time blood pressure load, insignificant  diastolic night time blood pressure load  (>20% is significant)     The Mean Arterial Blood Pressure (MABP) nocturnal dipping was 19 5%  Impression:    Magui Carroll 24 hour ambulatory blood pressure monitor average reading was 116/67 mm Hg, with a daytime average blood pressure reading of 122/73 mm Hg and a night time average blood pressure reading of 101/54 mm Hg  The MABP nocturnal dipping was 19 5%    Whether your patient has hypertension requiring treatment or not should be individualized accordingly to the risk versus benefits of the treatment    The definition of hypertension can be found from multiple sources, with the more recent ACC/AHA guidelines from 2017 providing an update on blood pressure thresholds from prior guidelines  White coat hypertension should be considered in the setting of consistently elevated office blood pressure readings and normotensive ABPM parameters  White coat HTN the risk of developing future HTN  Failure of the blood pressure to dip by at least 10% is called Non-dipping  Independent of hypertension, non-dipping is associated with end organ damage, and in some studies, progression of chronic kidney disease and cardiovascular events  Non-dippers should have an evaluation, in the appropriate setting, for underlying comorbidities (for example, sleep apnea and chronic kidney disease)  Recommendations: For those patients meeting hypertension criteria, further workup for end organ damage and secondary causes should be considered, as appropriate, in the overall evaluation and treatment of the patient  The decision to treat the patient with lifestyle modifications vs anti-hypertensive medications is based on the patient's ASCVD (Atherosclerotic Cardiovascular Disease) risk score, age and co-mordibities,  and thus must be individualized to the patient by the provider as per the AHA/ACC 2017 Guidelines  Please contact our Nephrology team if you need assistance in the management of the patient  Thank you for allowing me to participate in the care of your patient  If you have any questions or concerns, please do not hesitate to contact my office

## 2020-01-21 ENCOUNTER — TELEPHONE (OUTPATIENT)
Dept: NEPHROLOGY | Facility: CLINIC | Age: 25
End: 2020-01-21

## 2020-01-21 NOTE — TELEPHONE ENCOUNTER
Patient would like to know why do you think his BP readings were coming high?     Do you think its possible he was just having "white coat syndrome?"

## 2020-01-21 NOTE — TELEPHONE ENCOUNTER
Patient called and asked for results of ABPM testing  Test notes are in the chart from Dr Sirisha Nguyen  Please call the patient with results and any changes you would like to make  Thank you

## 2020-01-28 ENCOUNTER — OFFICE VISIT (OUTPATIENT)
Dept: FAMILY MEDICINE CLINIC | Facility: CLINIC | Age: 25
End: 2020-01-28
Payer: COMMERCIAL

## 2020-01-28 VITALS
HEART RATE: 123 BPM | SYSTOLIC BLOOD PRESSURE: 126 MMHG | TEMPERATURE: 98.4 F | HEIGHT: 69 IN | BODY MASS INDEX: 27.85 KG/M2 | OXYGEN SATURATION: 96 % | DIASTOLIC BLOOD PRESSURE: 82 MMHG | WEIGHT: 188 LBS

## 2020-01-28 DIAGNOSIS — Z00.00 WELL ADULT EXAM: Primary | ICD-10-CM

## 2020-01-28 DIAGNOSIS — F41.1 GENERALIZED ANXIETY DISORDER: ICD-10-CM

## 2020-01-28 DIAGNOSIS — Z11.3 SCREEN FOR STD (SEXUALLY TRANSMITTED DISEASE): ICD-10-CM

## 2020-01-28 DIAGNOSIS — K50.919 CROHN'S DISEASE WITH COMPLICATION, UNSPECIFIED GASTROINTESTINAL TRACT LOCATION (HCC): ICD-10-CM

## 2020-01-28 DIAGNOSIS — I10 WHITE COAT SYNDROME WITH HYPERTENSION: ICD-10-CM

## 2020-01-28 PROBLEM — R03.0 ELEVATED BLOOD PRESSURE READING: Status: RESOLVED | Noted: 2019-05-21 | Resolved: 2020-01-28

## 2020-01-28 PROCEDURE — 87389 HIV-1 AG W/HIV-1&-2 AB AG IA: CPT | Performed by: FAMILY MEDICINE

## 2020-01-28 PROCEDURE — 36415 COLL VENOUS BLD VENIPUNCTURE: CPT | Performed by: FAMILY MEDICINE

## 2020-01-28 PROCEDURE — 87491 CHLMYD TRACH DNA AMP PROBE: CPT | Performed by: FAMILY MEDICINE

## 2020-01-28 PROCEDURE — 87591 N.GONORRHOEAE DNA AMP PROB: CPT | Performed by: FAMILY MEDICINE

## 2020-01-28 PROCEDURE — 86592 SYPHILIS TEST NON-TREP QUAL: CPT | Performed by: FAMILY MEDICINE

## 2020-01-28 PROCEDURE — 99395 PREV VISIT EST AGE 18-39: CPT | Performed by: FAMILY MEDICINE

## 2020-01-28 NOTE — PROGRESS NOTES
Assessment/Plan:  Anticipatory guidance provided  Patient is requesting STD screening  He is currently asymptomatic  He does continue to follow with gastroenterology for history of Crohn's disease  No problem-specific Assessment & Plan notes found for this encounter  Diagnoses and all orders for this visit:    Well adult exam    Crohn's disease with complication, unspecified gastrointestinal tract location ([facility] [address] )    Generalized anxiety disorder    Screen for STD (sexually transmitted disease)  -     Chlamydia/GC amplified DNA by PCR  -     HIV 1/2 AG-AB combo  -     RPR          Subjective:      Patient ID: Mark Coronado is a 23 y o  male  Patient is here today for physical   He is generally feeling well  Crohn's disease is generally under good control and he does follow with a specialist on a regular basis  The following portions of the patient's history were reviewed and updated as appropriate: allergies, current medications, past family history, past medical history, past social history, past surgical history and problem list     Review of Systems   Constitutional: Negative  HENT: Negative  Eyes: Negative  Respiratory: Negative  Cardiovascular: Negative  Gastrointestinal: Negative  Endocrine: Negative  Genitourinary: Negative  Musculoskeletal: Negative  Skin: Negative  Allergic/Immunologic: Negative  Neurological: Negative  Hematological: Negative  Psychiatric/Behavioral: Negative  Objective:      /82 (BP Location: Left arm, Patient Position: Sitting, Cuff Size: Standard)   Pulse (!) 123   Temp 98 4 °F (36 9 °C) (Tympanic)   Ht 5' 8 8" (1 748 m)   Wt 85 3 kg (188 lb)   SpO2 96%   BMI 27 93 kg/m²          Physical Exam   Constitutional: He is oriented to person, place, and time  He appears well-developed and well-nourished  HENT:   Head: Normocephalic and atraumatic     Right Ear: External ear normal  Tympanic membrane is not erythematous and not bulging  Left Ear: External ear normal  Tympanic membrane is not erythematous and not bulging  Nose: Nose normal    Mouth/Throat: Oropharynx is clear and moist and mucous membranes are normal  No oral lesions  No oropharyngeal exudate  Eyes: Conjunctivae and EOM are normal  Right eye exhibits no discharge  Left eye exhibits no discharge  No scleral icterus  Neck: Normal range of motion  Neck supple  No thyromegaly present  Cardiovascular: Normal rate, regular rhythm and normal heart sounds  Exam reveals no gallop and no friction rub  No murmur heard  Pulmonary/Chest: Effort normal  No respiratory distress  He has no wheezes  He has no rales  He exhibits no tenderness  Abdominal: Soft  Bowel sounds are normal  He exhibits no distension and no mass  There is no tenderness  There is no rebound and no guarding  Musculoskeletal: Normal range of motion  He exhibits no edema, tenderness or deformity  Lymphadenopathy:     He has no cervical adenopathy  Neurological: He is alert and oriented to person, place, and time  He has normal reflexes  No cranial nerve deficit  He exhibits normal muscle tone  Coordination normal    Skin: Skin is warm and dry  No rash noted  No erythema  No pallor  Psychiatric: He has a normal mood and affect  His behavior is normal    Vitals reviewed

## 2020-01-28 NOTE — PROGRESS NOTES
BMI Counseling: Body mass index is 27 93 kg/m²  The BMI is above normal  Nutrition recommendations include reducing portion sizes

## 2020-01-28 NOTE — ASSESSMENT & PLAN NOTE
Patient was doing home blood pressure readings and they have been completely normotensive  We will continue to follow blood pressure readings in the office

## 2020-01-29 LAB
C TRACH DNA SPEC QL NAA+PROBE: NEGATIVE
HIV 1+2 AB+HIV1 P24 AG SERPL QL IA: NORMAL
N GONORRHOEA DNA SPEC QL NAA+PROBE: NEGATIVE
RPR SER QL: NORMAL

## 2020-02-13 ENCOUNTER — TELEPHONE (OUTPATIENT)
Dept: FAMILY MEDICINE CLINIC | Facility: CLINIC | Age: 25
End: 2020-02-13

## 2020-02-13 NOTE — TELEPHONE ENCOUNTER
Patient will be starting blood infusions for crohn diseases, very first one needs to be done at a infusion center and then he will be getting them at home doctor samson(GI) would like to know the first dose if this can be written by provider from [facility], Patient also see nephrology also referred for orders

## 2020-02-14 NOTE — TELEPHONE ENCOUNTER
Recommendation that this be coordinated through one of [facility] specialists

## 2020-02-17 NOTE — TELEPHONE ENCOUNTER
Kaye Murphy called back and states that processes are complete they just need to schedule for infusions

## 2020-03-06 ENCOUNTER — TELEPHONE (OUTPATIENT)
Dept: FAMILY MEDICINE CLINIC | Facility: CLINIC | Age: 25
End: 2020-03-06

## 2020-03-17 DIAGNOSIS — F41.1 GENERALIZED ANXIETY DISORDER: Primary | ICD-10-CM

## 2020-03-17 RX ORDER — CLONAZEPAM 0.5 MG/1
0.5 TABLET ORAL 2 TIMES DAILY
Qty: 60 TABLET | Refills: 0 | Status: SHIPPED | OUTPATIENT
Start: 2020-03-17 | End: 2020-04-21 | Stop reason: SDUPTHER

## 2020-03-17 RX ORDER — CLONAZEPAM 0.5 MG/1
TABLET ORAL
COMMUNITY
Start: 2020-01-13 | End: 2020-03-17 | Stop reason: SDUPTHER

## 2020-03-19 ENCOUNTER — OFFICE VISIT (OUTPATIENT)
Dept: FAMILY MEDICINE CLINIC | Facility: CLINIC | Age: 25
End: 2020-03-19
Payer: COMMERCIAL

## 2020-03-19 VITALS
HEIGHT: 69 IN | OXYGEN SATURATION: 98 % | TEMPERATURE: 98 F | WEIGHT: 180 LBS | SYSTOLIC BLOOD PRESSURE: 150 MMHG | BODY MASS INDEX: 26.66 KG/M2 | HEART RATE: 110 BPM | DIASTOLIC BLOOD PRESSURE: 80 MMHG

## 2020-03-19 DIAGNOSIS — Z11.3 SCREEN FOR STD (SEXUALLY TRANSMITTED DISEASE): Primary | ICD-10-CM

## 2020-03-19 LAB
SL AMB  POCT GLUCOSE, UA: NORMAL
SL AMB LEUKOCYTE ESTERASE,UA: NEGATIVE
SL AMB POCT BILIRUBIN,UA: NEGATIVE
SL AMB POCT BLOOD,UA: NEGATIVE
SL AMB POCT CLARITY,UA: CLEAR
SL AMB POCT COLOR,UA: YELLOW
SL AMB POCT KETONES,UA: NEGATIVE
SL AMB POCT NITRITE,UA: NEGATIVE
SL AMB POCT PH,UA: 5
SL AMB POCT SPECIFIC GRAVITY,UA: 1
SL AMB POCT URINE PROTEIN: NEGATIVE
SL AMB POCT UROBILINOGEN: NORMAL

## 2020-03-19 PROCEDURE — 3077F SYST BP >= 140 MM HG: CPT | Performed by: NURSE PRACTITIONER

## 2020-03-19 PROCEDURE — 36415 COLL VENOUS BLD VENIPUNCTURE: CPT | Performed by: NURSE PRACTITIONER

## 2020-03-19 PROCEDURE — 81002 URINALYSIS NONAUTO W/O SCOPE: CPT | Performed by: NURSE PRACTITIONER

## 2020-03-19 PROCEDURE — 87389 HIV-1 AG W/HIV-1&-2 AB AG IA: CPT | Performed by: NURSE PRACTITIONER

## 2020-03-19 PROCEDURE — 86592 SYPHILIS TEST NON-TREP QUAL: CPT | Performed by: NURSE PRACTITIONER

## 2020-03-19 PROCEDURE — 3008F BODY MASS INDEX DOCD: CPT | Performed by: NURSE PRACTITIONER

## 2020-03-19 PROCEDURE — 87591 N.GONORRHOEAE DNA AMP PROB: CPT | Performed by: NURSE PRACTITIONER

## 2020-03-19 PROCEDURE — 1036F TOBACCO NON-USER: CPT | Performed by: NURSE PRACTITIONER

## 2020-03-19 PROCEDURE — 87491 CHLMYD TRACH DNA AMP PROBE: CPT | Performed by: NURSE PRACTITIONER

## 2020-03-19 PROCEDURE — 99213 OFFICE O/P EST LOW 20 MIN: CPT | Performed by: NURSE PRACTITIONER

## 2020-03-19 PROCEDURE — 3079F DIAST BP 80-89 MM HG: CPT | Performed by: NURSE PRACTITIONER

## 2020-03-19 NOTE — PROGRESS NOTES
Assessment/Plan:    No problem-specific Assessment & Plan notes found for this encounter  Diagnoses and all orders for this visit:    Screen for STD (sexually transmitted disease)  -     POCT urine dip  -     HIV 1/2 Antigen/Antibody (4th Generation) w Reflex [facility]  -     Chlamydia/GC amplified DNA by PCR  -     RPR   Because patient had protected intercourse with partner in question and is currently a symptomatic, will hold off on medication until the labs are back  He agreed to have no intercourse until he hears from this office about his lab results  Subjective:      Patient ID: Jamin Rogers is a 23 y o  male  Pt here because he has had intercourse with 3 female partners in past 3 mo  He was told by one of these partners, with whom he used a condom, that she tested positive for chlamydia  He is currently asymptomatic  The following portions of the patient's history were reviewed and updated as appropriate: allergies, current medications, past family history, past medical history, past social history, past surgical history and problem list     Review of Systems   Constitutional: Negative for fatigue and fever  Gastrointestinal: Negative for abdominal pain (has occ due to Crohn's), nausea and vomiting  Genitourinary: Negative for difficulty urinating, discharge, dysuria, genital sores, penile pain, penile swelling, scrotal swelling, testicular pain and urgency  Musculoskeletal: Negative for arthralgias and myalgias  Skin: Negative for rash  Neurological: Negative for dizziness and weakness  Objective:      /80 (BP Location: Left arm, Patient Position: Sitting, Cuff Size: Adult)   Pulse (!) 110   Temp 98 °F (36 7 °C)   Ht 5' 8 9" (1 75 m)   Wt 81 6 kg (180 lb)   SpO2 98%   BMI 26 66 kg/m²          Physical Exam   Constitutional: He appears well-developed and well-nourished  HENT:   Head: Normocephalic     Right Ear: External ear normal    Left Ear: External ear normal    Nose: Nose normal    Mouth/Throat: Oropharynx is clear and moist    Eyes: EOM are normal    Neck: Normal range of motion  Neck supple  Cardiovascular: Normal rate, regular rhythm and normal heart sounds  Pulmonary/Chest: Effort normal and breath sounds normal    Abdominal: Soft  Bowel sounds are normal    Genitourinary: Penis normal    Genitourinary Comments: No penile lesions or discharge  Testes smooth bilaterally  Lymphadenopathy:     He has no cervical adenopathy

## 2020-03-20 LAB
HIV 1+2 AB+HIV1 P24 AG SERPL QL IA: NORMAL
RPR SER QL: NORMAL

## 2020-03-21 LAB
C TRACH DNA SPEC QL NAA+PROBE: NEGATIVE
N GONORRHOEA DNA SPEC QL NAA+PROBE: NEGATIVE

## 2020-04-08 ENCOUNTER — TELEPHONE (OUTPATIENT)
Dept: FAMILY MEDICINE CLINIC | Facility: CLINIC | Age: 25
End: 2020-04-08

## 2020-04-16 ENCOUNTER — TELEPHONE (OUTPATIENT)
Dept: FAMILY MEDICINE CLINIC | Facility: CLINIC | Age: 25
End: 2020-04-16

## 2020-04-21 ENCOUNTER — HOSPITAL ENCOUNTER (OUTPATIENT)
Dept: MRI IMAGING | Facility: HOSPITAL | Age: 25
Discharge: HOME | End: 2020-04-21

## 2020-04-21 DIAGNOSIS — F41.1 GENERALIZED ANXIETY DISORDER: ICD-10-CM

## 2020-04-22 RX ORDER — CLONAZEPAM 0.5 MG/1
0.5 TABLET ORAL 2 TIMES DAILY
Qty: 60 TABLET | Refills: 0 | Status: SHIPPED | OUTPATIENT
Start: 2020-04-22 | End: 2020-05-16 | Stop reason: SDUPTHER

## 2020-04-24 ENCOUNTER — TRANSCRIBE ORDERS (OUTPATIENT)
Dept: ADMINISTRATIVE | Facility: HOSPITAL | Age: 25
End: 2020-04-24

## 2020-04-24 ENCOUNTER — APPOINTMENT (OUTPATIENT)
Dept: LAB | Age: 25
End: 2020-04-24
Payer: COMMERCIAL

## 2020-04-24 DIAGNOSIS — K51.00 CHRONIC UNIVERSAL ULCERATIVE COLITIS (HCC): Primary | ICD-10-CM

## 2020-04-24 DIAGNOSIS — K51.00 CHRONIC UNIVERSAL ULCERATIVE COLITIS (HCC): ICD-10-CM

## 2020-04-24 LAB
ALBUMIN SERPL BCP-MCNC: 3.2 G/DL (ref 3.5–5)
ALP SERPL-CCNC: 88 U/L (ref 46–116)
ALT SERPL W P-5'-P-CCNC: 22 U/L (ref 12–78)
ANION GAP SERPL CALCULATED.3IONS-SCNC: 4 MMOL/L (ref 4–13)
AST SERPL W P-5'-P-CCNC: 12 U/L (ref 5–45)
BILIRUB SERPL-MCNC: 0.58 MG/DL (ref 0.2–1)
BUN SERPL-MCNC: 14 MG/DL (ref 5–25)
CALCIUM SERPL-MCNC: 9.6 MG/DL (ref 8.3–10.1)
CHLORIDE SERPL-SCNC: 106 MMOL/L (ref 100–108)
CO2 SERPL-SCNC: 28 MMOL/L (ref 21–32)
CREAT SERPL-MCNC: 1.05 MG/DL (ref 0.6–1.3)
EST. AVERAGE GLUCOSE BLD GHB EST-MCNC: 114 MG/DL
GFR SERPL CREATININE-BSD FRML MDRD: 100 ML/MIN/1.73SQ M
GLUCOSE P FAST SERPL-MCNC: 87 MG/DL (ref 65–99)
HBA1C MFR BLD: 5.6 %
POTASSIUM SERPL-SCNC: 3.9 MMOL/L (ref 3.5–5.3)
PROT SERPL-MCNC: 8.2 G/DL (ref 6.4–8.2)
SODIUM SERPL-SCNC: 138 MMOL/L (ref 136–145)

## 2020-04-24 PROCEDURE — 36415 COLL VENOUS BLD VENIPUNCTURE: CPT

## 2020-04-24 PROCEDURE — 83036 HEMOGLOBIN GLYCOSYLATED A1C: CPT

## 2020-04-24 PROCEDURE — 83993 ASSAY FOR CALPROTECTIN FECAL: CPT

## 2020-04-24 PROCEDURE — 80053 COMPREHEN METABOLIC PANEL: CPT

## 2020-04-27 LAB — CALPROTECTIN STL-MCNT: 952 UG/G (ref 0–120)

## 2020-05-04 ENCOUNTER — HOSPITAL ENCOUNTER (OUTPATIENT)
Dept: MRI IMAGING | Facility: HOSPITAL | Age: 25
Discharge: HOME | End: 2020-05-04

## 2020-05-08 ENCOUNTER — TELEPHONE (OUTPATIENT)
Dept: FAMILY MEDICINE CLINIC | Facility: CLINIC | Age: 25
End: 2020-05-08

## 2020-05-11 ENCOUNTER — CLINICAL SUPPORT (OUTPATIENT)
Dept: FAMILY MEDICINE CLINIC | Facility: CLINIC | Age: 25
End: 2020-05-11
Payer: COMMERCIAL

## 2020-05-11 ENCOUNTER — APPOINTMENT (OUTPATIENT)
Dept: LAB | Age: 25
End: 2020-05-11
Payer: COMMERCIAL

## 2020-05-11 DIAGNOSIS — Z23 NEED FOR DIPHTHERIA-TETANUS-PERTUSSIS (TDAP) VACCINE: Primary | ICD-10-CM

## 2020-05-11 DIAGNOSIS — Z01.84 ENCOUNTER FOR IMMUNOLOGICAL TEST: Primary | ICD-10-CM

## 2020-05-11 DIAGNOSIS — Z11.1 VISIT FOR TB SKIN TEST: ICD-10-CM

## 2020-05-11 LAB
HBV SURFACE AB SER-ACNC: 6.26 MIU/ML
HCV AB SER QL: NORMAL
RUBV IGG SERPL IA-ACNC: 21.8 IU/ML

## 2020-05-11 PROCEDURE — 36415 COLL VENOUS BLD VENIPUNCTURE: CPT | Performed by: FAMILY MEDICINE

## 2020-05-11 PROCEDURE — 86706 HEP B SURFACE ANTIBODY: CPT | Performed by: FAMILY MEDICINE

## 2020-05-11 PROCEDURE — 86735 MUMPS ANTIBODY: CPT | Performed by: FAMILY MEDICINE

## 2020-05-11 PROCEDURE — 90471 IMMUNIZATION ADMIN: CPT

## 2020-05-11 PROCEDURE — 86803 HEPATITIS C AB TEST: CPT | Performed by: FAMILY MEDICINE

## 2020-05-11 PROCEDURE — 86765 RUBEOLA ANTIBODY: CPT | Performed by: FAMILY MEDICINE

## 2020-05-11 PROCEDURE — 86787 VARICELLA-ZOSTER ANTIBODY: CPT | Performed by: FAMILY MEDICINE

## 2020-05-11 PROCEDURE — 86762 RUBELLA ANTIBODY: CPT | Performed by: FAMILY MEDICINE

## 2020-05-11 PROCEDURE — 90715 TDAP VACCINE 7 YRS/> IM: CPT

## 2020-05-11 PROCEDURE — 86580 TB INTRADERMAL TEST: CPT

## 2020-05-12 LAB
MEV IGG SER QL: NORMAL
MUV IGG SER QL: NORMAL
VZV IGG SER IA-ACNC: NORMAL

## 2020-05-13 ENCOUNTER — CLINICAL SUPPORT (OUTPATIENT)
Dept: FAMILY MEDICINE CLINIC | Facility: CLINIC | Age: 25
End: 2020-05-13

## 2020-05-13 DIAGNOSIS — Z11.1 ENCOUNTER FOR PPD SKIN TEST READING: Primary | ICD-10-CM

## 2020-05-13 LAB
INDURATION: 0 MM
TB SKIN TEST: NEGATIVE

## 2020-05-15 ENCOUNTER — TELEPHONE (OUTPATIENT)
Dept: FAMILY MEDICINE CLINIC | Facility: CLINIC | Age: 25
End: 2020-05-15

## 2020-05-16 DIAGNOSIS — F41.1 GENERALIZED ANXIETY DISORDER: ICD-10-CM

## 2020-05-18 ENCOUNTER — TELEPHONE (OUTPATIENT)
Dept: FAMILY MEDICINE CLINIC | Facility: CLINIC | Age: 25
End: 2020-05-18

## 2020-05-18 ENCOUNTER — CLINICAL SUPPORT (OUTPATIENT)
Dept: FAMILY MEDICINE CLINIC | Facility: CLINIC | Age: 25
End: 2020-05-18
Payer: COMMERCIAL

## 2020-05-18 DIAGNOSIS — Z23 NEED FOR HEPATITIS B VACCINATION: Primary | ICD-10-CM

## 2020-05-18 PROCEDURE — 90471 IMMUNIZATION ADMIN: CPT

## 2020-05-18 PROCEDURE — 90746 HEPB VACCINE 3 DOSE ADULT IM: CPT

## 2020-05-20 RX ORDER — CLONAZEPAM 0.5 MG/1
0.5 TABLET ORAL 2 TIMES DAILY
Qty: 60 TABLET | Refills: 0 | Status: SHIPPED | OUTPATIENT
Start: 2020-05-20 | End: 2020-06-26

## 2020-05-22 DIAGNOSIS — Z01.84 IMMUNITY STATUS TESTING: Primary | ICD-10-CM

## 2020-05-26 ENCOUNTER — OFFICE VISIT (OUTPATIENT)
Dept: FAMILY MEDICINE CLINIC | Facility: CLINIC | Age: 25
End: 2020-05-26
Payer: COMMERCIAL

## 2020-05-26 VITALS
DIASTOLIC BLOOD PRESSURE: 82 MMHG | SYSTOLIC BLOOD PRESSURE: 124 MMHG | TEMPERATURE: 98.5 F | WEIGHT: 167 LBS | HEIGHT: 68 IN | BODY MASS INDEX: 25.31 KG/M2

## 2020-05-26 DIAGNOSIS — R30.0 DYSURIA: Primary | ICD-10-CM

## 2020-05-26 DIAGNOSIS — Z01.84 IMMUNITY STATUS TESTING: ICD-10-CM

## 2020-05-26 PROCEDURE — 87389 HIV-1 AG W/HIV-1&-2 AB AG IA: CPT | Performed by: FAMILY MEDICINE

## 2020-05-26 PROCEDURE — 87491 CHLMYD TRACH DNA AMP PROBE: CPT | Performed by: FAMILY MEDICINE

## 2020-05-26 PROCEDURE — 3079F DIAST BP 80-89 MM HG: CPT | Performed by: FAMILY MEDICINE

## 2020-05-26 PROCEDURE — 86706 HEP B SURFACE ANTIBODY: CPT | Performed by: FAMILY MEDICINE

## 2020-05-26 PROCEDURE — 87591 N.GONORRHOEAE DNA AMP PROB: CPT | Performed by: FAMILY MEDICINE

## 2020-05-26 PROCEDURE — 36415 COLL VENOUS BLD VENIPUNCTURE: CPT | Performed by: FAMILY MEDICINE

## 2020-05-26 PROCEDURE — 3074F SYST BP LT 130 MM HG: CPT | Performed by: FAMILY MEDICINE

## 2020-05-26 PROCEDURE — 87086 URINE CULTURE/COLONY COUNT: CPT | Performed by: FAMILY MEDICINE

## 2020-05-26 PROCEDURE — 80074 ACUTE HEPATITIS PANEL: CPT | Performed by: FAMILY MEDICINE

## 2020-05-26 PROCEDURE — 86592 SYPHILIS TEST NON-TREP QUAL: CPT | Performed by: FAMILY MEDICINE

## 2020-05-26 PROCEDURE — 99214 OFFICE O/P EST MOD 30 MIN: CPT | Performed by: FAMILY MEDICINE

## 2020-05-26 PROCEDURE — 3008F BODY MASS INDEX DOCD: CPT | Performed by: FAMILY MEDICINE

## 2020-05-26 PROCEDURE — 1036F TOBACCO NON-USER: CPT | Performed by: FAMILY MEDICINE

## 2020-05-27 LAB
BACTERIA UR CULT: NORMAL
HAV IGM SER QL: NORMAL
HBV CORE IGM SER QL: NORMAL
HBV SURFACE AB SER-ACNC: 392.85 MIU/ML
HBV SURFACE AG SER QL: NORMAL
HCV AB SER QL: NORMAL
RPR SER QL: NORMAL

## 2020-05-28 LAB — HIV 1+2 AB+HIV1 P24 AG SERPL QL IA: NORMAL

## 2020-05-29 LAB
C TRACH DNA SPEC QL NAA+PROBE: NEGATIVE
N GONORRHOEA DNA SPEC QL NAA+PROBE: NEGATIVE

## 2020-06-01 ENCOUNTER — TELEPHONE (OUTPATIENT)
Dept: FAMILY MEDICINE CLINIC | Facility: CLINIC | Age: 25
End: 2020-06-01

## 2020-06-26 ENCOUNTER — OFFICE VISIT (OUTPATIENT)
Dept: UROLOGY | Facility: MEDICAL CENTER | Age: 25
End: 2020-06-26
Payer: COMMERCIAL

## 2020-06-26 VITALS
WEIGHT: 165 LBS | TEMPERATURE: 97.3 F | DIASTOLIC BLOOD PRESSURE: 70 MMHG | HEART RATE: 78 BPM | HEIGHT: 68 IN | BODY MASS INDEX: 25.01 KG/M2 | SYSTOLIC BLOOD PRESSURE: 116 MMHG

## 2020-06-26 DIAGNOSIS — N50.819 TESTIS PAIN: Primary | ICD-10-CM

## 2020-06-26 PROCEDURE — 1036F TOBACCO NON-USER: CPT | Performed by: UROLOGY

## 2020-06-26 PROCEDURE — 3074F SYST BP LT 130 MM HG: CPT | Performed by: UROLOGY

## 2020-06-26 PROCEDURE — 81003 URINALYSIS AUTO W/O SCOPE: CPT | Performed by: UROLOGY

## 2020-06-26 PROCEDURE — 99203 OFFICE O/P NEW LOW 30 MIN: CPT | Performed by: UROLOGY

## 2020-06-26 PROCEDURE — 3078F DIAST BP <80 MM HG: CPT | Performed by: UROLOGY

## 2020-06-29 ENCOUNTER — HOSPITAL ENCOUNTER (OUTPATIENT)
Dept: ULTRASOUND IMAGING | Facility: HOSPITAL | Age: 25
Discharge: HOME | End: 2020-06-29
Attending: UROLOGY
Payer: COMMERCIAL

## 2020-06-29 ENCOUNTER — HOSPITAL ENCOUNTER (OUTPATIENT)
Dept: MRI IMAGING | Facility: HOSPITAL | Age: 25
Discharge: HOME | End: 2020-06-29

## 2020-06-29 DIAGNOSIS — N50.819 TESTIS PAIN: ICD-10-CM

## 2020-06-29 PROCEDURE — 76870 US EXAM SCROTUM: CPT

## 2020-06-30 ENCOUNTER — HOSPITAL ENCOUNTER (OUTPATIENT)
Dept: MRI IMAGING | Facility: HOSPITAL | Age: 25
Discharge: HOME | End: 2020-06-30
Payer: COMMERCIAL

## 2020-06-30 DIAGNOSIS — K50.919 CROHN'S DISEASE WITH COMPLICATION, UNSPECIFIED GASTROINTESTINAL TRACT LOCATION (HCC): ICD-10-CM

## 2020-06-30 PROCEDURE — 74183 MRI ABD W/O CNTR FLWD CNTR: CPT

## 2020-06-30 PROCEDURE — 72197 MRI PELVIS W/O & W/DYE: CPT

## 2020-06-30 PROCEDURE — A9585 GADOBUTROL INJECTION: HCPCS | Performed by: RADIOLOGY

## 2020-06-30 RX ADMIN — GADOBUTROL 7 ML: 604.72 INJECTION INTRAVENOUS at 10:34

## 2020-06-30 RX ADMIN — GLUCAGON HYDROCHLORIDE 1 MG: KIT at 10:28

## 2020-07-17 ENCOUNTER — TELEPHONE (OUTPATIENT)
Dept: FAMILY MEDICINE CLINIC | Facility: CLINIC | Age: 25
End: 2020-07-17

## 2020-07-17 NOTE — TELEPHONE ENCOUNTER
Maria Guadalupe Gutierrez called and wants to go back on Klonopin 0 5 mg because he has been getting anxious again with school and Covid-19    He is away at school in [place] and would like it sent to pharmacy out there      [facility]  [address] [address], [address]    Please advise  Thank you

## 2020-07-22 ENCOUNTER — TELEMEDICINE (OUTPATIENT)
Dept: FAMILY MEDICINE CLINIC | Facility: CLINIC | Age: 25
End: 2020-07-22
Payer: COMMERCIAL

## 2020-07-22 ENCOUNTER — TELEPHONE (OUTPATIENT)
Dept: FAMILY MEDICINE CLINIC | Facility: CLINIC | Age: 25
End: 2020-07-22

## 2020-07-22 DIAGNOSIS — F41.1 GENERALIZED ANXIETY DISORDER: Primary | ICD-10-CM

## 2020-07-22 PROCEDURE — 3074F SYST BP LT 130 MM HG: CPT | Performed by: FAMILY MEDICINE

## 2020-07-22 PROCEDURE — 3078F DIAST BP <80 MM HG: CPT | Performed by: FAMILY MEDICINE

## 2020-07-22 PROCEDURE — 99214 OFFICE O/P EST MOD 30 MIN: CPT | Performed by: FAMILY MEDICINE

## 2020-07-22 PROCEDURE — 1036F TOBACCO NON-USER: CPT | Performed by: FAMILY MEDICINE

## 2020-07-22 NOTE — TELEPHONE ENCOUNTER
Pt had a telemedicine appt with you today  Stated that you were going to call in xanax 0 25mg for him  This has not been called in yet       Please call  [facility] [address]

## 2020-07-22 NOTE — ASSESSMENT & PLAN NOTE
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

## 2020-07-22 NOTE — PROGRESS NOTES
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  He states the Klonopin was making him too tired  He is planning on seeing a psychiatrist out in [place] where he is attending school and he is planning on discussing the daily medications for anxiety as well  He will call us with any worsening symptoms  Reason for visit is   Chief Complaint   Patient presents with    Virtual Regular Visit        Encounter provider Donna Arzola DO    Provider located at [address] [place] [address] [phone]      Recent Visits  Date Type Provider Dept   07/17/20 Telephone Radha Jackson, [address]  recent visits within past 7 days and meeting all other requirements     Today's Visits  Date Type Provider Dept   07/22/20 Telemedicine Donna Arzola DO [facility]   Showing today's visits and meeting all other requirements     Future Appointments  No visits were found meeting these conditions  Showing future appointments within next 150 days and meeting all other requirements        The patient was identified by name and date of birth  Sumaya Cameron was informed that this is a telemedicine visit and that the visit is being conducted through [address] [place] [address] and patient was informed that this is not a secure, HIPAA-complaint platform  He agrees to proceed     My office door was closed  No one else was in the room  He acknowledged consent and understanding of privacy and security of the video platform  The patient has agreed to participate and understands they can discontinue the visit at any time  Patient is aware this is a billable service  Subjective  Trina Lane is a 23 y o  male for anxiety   Patient with increased anxiety over the last several weeks  He has not been taking Klonopin as taking it regularly was making him tired  He is planning on seeing a  psychiatrist in [facility]  He would like to discuss other treatment options  Past Medical History:   Diagnosis Date    Anxiety     Resolved: 1/9/2018     Clostridium difficile colitis 2011    Crohn's disease ([address] )     Epididymitis 12/21/2017    Resolved: 1/9/2018     Mass     Left neck    Pneumonia     x1    PONV (postoperative nausea and vomiting)     States this occurred while having colonoscopy or EGD and had to be suctioned    Seasonal allergies     Spermatocele     Wears contact lenses     Wears glasses        Past Surgical History:   Procedure Laterality Date    BOWEL RESECTION  2011    Small bowel    COLONOSCOPY      ESOPHAGOGASTRODUODENOSCOPY      NH BX/REMV,LYMPH NODE,DEEP CERV Left 10/16/2018    Procedure: DEEP NECK MASS/DEEP NODE EXCISION;  Surgeon: Eloisa Puri DO;  Location: [facility];  Service: ENT       Current Outpatient Medications   Medication Sig Dispense Refill    multivitamin (THERAGRAN) TABS Take 1 tablet by mouth daily      Omega-3 Fatty Acids (FISH OIL PO) Take 2 capsules by mouth daily        Vedolizumab (ENTYVIO IV) Infuse into a venous catheter       No current facility-administered medications for this visit  Allergies   Allergen Reactions    Penicillins Hives and Rash       Review of Systems   Constitutional: Negative  HENT: Negative  Eyes: Negative  Respiratory: Negative  Cardiovascular: Negative  Gastrointestinal: Negative  Endocrine: Negative  Genitourinary: Negative  Musculoskeletal: Negative  Skin: Negative  Allergic/Immunologic: Negative  Neurological: Negative  Hematological: Negative  Psychiatric/Behavioral: Positive for decreased concentration   Negative for agitation, behavioral problems, confusion, dysphoric mood, hallucinations, self-injury, sleep disturbance and suicidal ideas  The patient is nervous/anxious  The patient is not hyperactive  Video Exam    There were no vitals filed for this visit  Physical Exam   Constitutional: He is oriented to person, place, and time  He appears well-developed and well-nourished  HENT:   Head: Normocephalic and atraumatic  Right Ear: External ear normal  Tympanic membrane is not erythematous and not bulging  Left Ear: External ear normal  Tympanic membrane is not erythematous and not bulging  Nose: Nose normal    Mouth/Throat: Oropharynx is clear and moist and mucous membranes are normal  No oral lesions  No oropharyngeal exudate  Eyes: Conjunctivae and EOM are normal  Right eye exhibits no discharge  Left eye exhibits no discharge  No scleral icterus  Neck: Normal range of motion  Neck supple  No thyromegaly present  Cardiovascular: Normal rate, regular rhythm and normal heart sounds  Exam reveals no gallop and no friction rub  No murmur heard  Pulmonary/Chest: Effort normal  No respiratory distress  He has no wheezes  He has no rales  He exhibits no tenderness  Abdominal: Soft  Bowel sounds are normal  He exhibits no distension and no mass  There is no tenderness  There is no rebound and no guarding  Musculoskeletal: Normal range of motion  He exhibits no edema, tenderness or deformity  Lymphadenopathy:     He has no cervical adenopathy  Neurological: He is alert and oriented to person, place, and time  He has normal reflexes  No cranial nerve deficit  He exhibits normal muscle tone  Coordination normal    Skin: Skin is warm and dry  No rash noted  No erythema  No pallor  Psychiatric: He has a normal mood and affect  His behavior is normal    Vitals reviewed         I spent 25 minutes with patient today in which greater than 50% of the time was spent in counseling/coordination of care regarding Anxiety      VIRTUAL VISIT DISCLAIMER    Riley Drew acknowledges that he has consented to an online visit or consultation  He understands that the online visit is based solely on information provided by him, and that, in the absence of a face-to-face physical evaluation by the physician, the diagnosis he receives is both limited and provisional in terms of accuracy and completeness  This is not intended to replace a full medical face-to-face evaluation by the physician  Riley Drew understands and accepts these terms

## 2020-07-23 DIAGNOSIS — F41.1 GENERALIZED ANXIETY DISORDER: Primary | ICD-10-CM

## 2020-07-23 RX ORDER — ALPRAZOLAM 0.25 MG/1
0.25 TABLET ORAL 3 TIMES DAILY PRN
Qty: 90 TABLET | Refills: 0 | Status: SHIPPED | OUTPATIENT
Start: 2020-07-23 | End: 2020-08-21 | Stop reason: SDUPTHER

## 2020-07-28 ENCOUNTER — TELEPHONE (OUTPATIENT)
Dept: FAMILY MEDICINE CLINIC | Facility: CLINIC | Age: 25
End: 2020-07-28

## 2020-07-29 ENCOUNTER — TELEMEDICINE (OUTPATIENT)
Dept: FAMILY MEDICINE CLINIC | Facility: CLINIC | Age: 25
End: 2020-07-29
Payer: COMMERCIAL

## 2020-07-29 DIAGNOSIS — Z71.1 CONCERN ABOUT STD IN MALE WITHOUT DIAGNOSIS: ICD-10-CM

## 2020-07-29 DIAGNOSIS — J32.9 SINUSITIS, UNSPECIFIED CHRONICITY, UNSPECIFIED LOCATION: Primary | ICD-10-CM

## 2020-07-29 PROCEDURE — 1036F TOBACCO NON-USER: CPT | Performed by: FAMILY MEDICINE

## 2020-07-29 PROCEDURE — 3074F SYST BP LT 130 MM HG: CPT | Performed by: FAMILY MEDICINE

## 2020-07-29 PROCEDURE — 99214 OFFICE O/P EST MOD 30 MIN: CPT | Performed by: FAMILY MEDICINE

## 2020-07-29 PROCEDURE — 3078F DIAST BP <80 MM HG: CPT | Performed by: FAMILY MEDICINE

## 2020-07-29 RX ORDER — AZITHROMYCIN 250 MG/1
TABLET, FILM COATED ORAL
Qty: 6 TABLET | Refills: 0 | Status: SHIPPED | OUTPATIENT
Start: 2020-07-29 | End: 2020-08-05

## 2020-07-29 RX ORDER — AZELASTINE 1 MG/ML
2 SPRAY, METERED NASAL 2 TIMES DAILY
Qty: 30 ML | Refills: 1 | Status: SHIPPED | OUTPATIENT
Start: 2020-07-29 | End: 2020-08-28

## 2020-07-29 RX ORDER — FLUTICASONE PROPIONATE 50 MCG
2 SPRAY, SUSPENSION (ML) NASAL DAILY
Qty: 16 G | Refills: 0 | Status: SHIPPED | OUTPATIENT
Start: 2020-07-29 | End: 2020-08-28

## 2020-07-29 NOTE — PROGRESS NOTES
Virtual Regular Visit      Assessment/Plan:  STD testing ordered  Await results  STD prevention counseling also addressed  Also treat for sinus infection  Consider follow-up with ENT specialist if needed  Covid testing if any symptoms persist or worsen or fevers develop  Problem List Items Addressed This Visit     None      Visit Diagnoses     Sinusitis, unspecified chronicity, unspecified location    -  Primary    Relevant Medications    fluticasone (FLONASE) 50 mcg/act nasal spray    azelastine (ASTELIN) 0 1 % nasal spray    azithromycin (ZITHROMAX) 250 mg tablet    Other Relevant Orders    Novel Coronavirus (COVID-19), PCR [facility] - Collected at [facility] or [facility]    Concern about STD in male without diagnosis        Relevant Orders    Chlamydia/GC amplified DNA by PCR    Hepatitis panel, acute    HIV 1/2 Antigen/Antibody (4th Generation) w Reflex [facility]    RPR               Reason for visit is   Chief Complaint   Patient presents with    Virtual Regular Visit        Encounter provider Katina Vasquez DO    Provider located at [address] [place] [address] [phone]      Recent Visits  Date Type Provider Dept   07/29/20 Valentine Sanchez,  [address]   07/28/20 Telephone Griselda Lares MA [facility]   Showing recent visits within past 7 days and meeting all other requirements     Future Appointments  No visits were found meeting these conditions  Showing future appointments within next 150 days and meeting all other requirements        The patient was identified by name and date of birth  Dallin House was informed that this is a telemedicine visit and that the visit is being conducted through [address] [place] [address] and patient was informed that this is not a secure, HIPAA-complaint platform  He agrees to proceed     My office door was closed  No one else was in the room    He acknowledged consent and understanding of privacy and security of the video platform  The patient has agreed to participate and understands they can discontinue the visit at any time  Patient is aware this is a billable service  Subjective  Bird Evangelista is a 23 y o  male for sinus pressure and congestion   Patient is here for virtual visit  He has sinus pressure and congestion chronically over the last 2 or 3 weeks  Denies any fevers  No cough  No GI complaints  Symptoms are mild and intermittent  He also would like to have STD testing done         Past Medical History:   Diagnosis Date    Anxiety     Resolved: 1/9/2018     Clostridium difficile colitis 2011    [address] [address]     Epididymitis 12/21/2017    Resolved: 1/9/2018     Mass     Left neck    Pneumonia     x1    PONV (postoperative nausea and vomiting)     States this occurred while having colonoscopy or EGD and had to be suctioned    Seasonal allergies     Spermatocele     Wears contact lenses     Wears glasses        Past Surgical History:   Procedure Laterality Date    BOWEL RESECTION  2011    Small bowel    COLONOSCOPY      ESOPHAGOGASTRODUODENOSCOPY      MT BX/REMV,LYMPH NODE,DEEP CERV Left 10/16/2018    Procedure: DEEP NECK MASS/DEEP NODE EXCISION;  Surgeon: Huong Hart DO;  Location: [facility];  Service: ENT       Current Outpatient Medications   Medication Sig Dispense Refill    ALPRAZolam (XANAX) 0 25 mg tablet Take 1 tablet (0 25 mg total) by mouth 3 (three) times a day as needed for anxiety 90 tablet 0    azelastine (ASTELIN) 0 1 % nasal spray 2 sprays into each nostril 2 (two) times a day Use in each nostril as directed 30 mL 1    azithromycin (ZITHROMAX) 250 mg tablet Take 2 tablets today then 1 tablet daily x 4 days 6 tablet 0    fluticasone (FLONASE) 50 mcg/act nasal spray 2 sprays into each nostril daily 16 g 0    multivitamin (THERAGRAN) TABS Take 1 tablet by mouth daily      Omega-3 Fatty Acids (FISH OIL PO) Take 2 capsules by mouth daily  Vedolizumab (ENTYVIO IV) Infuse into a venous catheter       No current facility-administered medications for this visit  Allergies   Allergen Reactions    Penicillins Hives and Rash       Review of Systems   Constitutional: Negative  HENT: Positive for congestion and sinus pressure  Eyes: Negative  Respiratory: Negative  Negative for cough  Cardiovascular: Negative  Gastrointestinal: Negative  Endocrine: Negative  Genitourinary: Negative  Musculoskeletal: Negative  Skin: Negative  Allergic/Immunologic: Negative  Neurological: Negative  Hematological: Negative  Psychiatric/Behavioral: Negative  Video Exam    There were no vitals filed for this visit  Physical Exam   Constitutional: He is oriented to person, place, and time  He appears well-developed and well-nourished  No distress  Neurological: He is alert and oriented to person, place, and time  Skin: He is not diaphoretic  Psychiatric: He has a normal mood and affect  His behavior is normal  Judgment and thought content normal         I spent 15 minutes with patient today in which greater than 50% of the time was spent in counseling/coordination of care regarding congestion      VIRTUAL VISIT DISCLAIMER    Gia Terry acknowledges that he has consented to an online visit or consultation  He understands that the online visit is based solely on information provided by him, and that, in the absence of a face-to-face physical evaluation by the physician, the diagnosis he receives is both limited and provisional in terms of accuracy and completeness  This is not intended to replace a full medical face-to-face evaluation by the physician  Gia Terry understands and accepts these terms

## 2020-08-21 DIAGNOSIS — F41.1 GENERALIZED ANXIETY DISORDER: ICD-10-CM

## 2020-08-27 DIAGNOSIS — F41.1 GENERALIZED ANXIETY DISORDER: ICD-10-CM

## 2020-08-27 RX ORDER — ALPRAZOLAM 0.25 MG/1
0.25 TABLET ORAL 3 TIMES DAILY PRN
Qty: 90 TABLET | Refills: 0 | Status: SHIPPED | OUTPATIENT
Start: 2020-08-27 | End: 2020-08-27 | Stop reason: SDUPTHER

## 2020-08-30 RX ORDER — ALPRAZOLAM 0.25 MG/1
0.25 TABLET ORAL 3 TIMES DAILY PRN
Qty: 90 TABLET | Refills: 0 | Status: SHIPPED | OUTPATIENT
Start: 2020-08-30 | End: 2020-10-08 | Stop reason: SDUPTHER

## 2020-10-08 DIAGNOSIS — F41.1 GENERALIZED ANXIETY DISORDER: ICD-10-CM

## 2020-10-11 RX ORDER — ALPRAZOLAM 0.25 MG/1
0.25 TABLET ORAL 3 TIMES DAILY PRN
Qty: 90 TABLET | Refills: 0 | Status: SHIPPED | OUTPATIENT
Start: 2020-10-11 | End: 2020-12-02 | Stop reason: SDUPTHER

## 2020-12-02 DIAGNOSIS — F41.1 GENERALIZED ANXIETY DISORDER: ICD-10-CM

## 2020-12-03 RX ORDER — ALPRAZOLAM 0.25 MG/1
0.25 TABLET ORAL 3 TIMES DAILY PRN
Qty: 90 TABLET | Refills: 0 | Status: SHIPPED | OUTPATIENT
Start: 2020-12-03 | End: 2021-02-09 | Stop reason: SDUPTHER

## 2020-12-03 RX ORDER — ALPRAZOLAM 0.25 MG/1
0.25 TABLET ORAL 3 TIMES DAILY PRN
Qty: 90 TABLET | Refills: 0 | OUTPATIENT
Start: 2020-12-03

## 2021-02-09 DIAGNOSIS — F41.1 GENERALIZED ANXIETY DISORDER: ICD-10-CM

## 2021-02-09 RX ORDER — ALPRAZOLAM 0.25 MG/1
0.25 TABLET ORAL 3 TIMES DAILY PRN
Qty: 90 TABLET | Refills: 0 | Status: CANCELLED | OUTPATIENT
Start: 2021-02-09

## 2021-02-09 RX ORDER — ALPRAZOLAM 0.25 MG/1
0.25 TABLET ORAL 3 TIMES DAILY PRN
Qty: 90 TABLET | Refills: 0 | Status: SHIPPED | OUTPATIENT
Start: 2021-02-09 | End: 2021-06-24 | Stop reason: SDUPTHER

## 2021-03-22 DIAGNOSIS — F41.1 GENERALIZED ANXIETY DISORDER: ICD-10-CM

## 2021-03-22 RX ORDER — ALPRAZOLAM 0.25 MG/1
0.25 TABLET ORAL 3 TIMES DAILY PRN
Qty: 90 TABLET | Refills: 0 | Status: CANCELLED | OUTPATIENT
Start: 2021-03-22

## 2021-03-24 RX ORDER — ALPRAZOLAM 0.25 MG/1
0.25 TABLET ORAL 3 TIMES DAILY PRN
Qty: 90 TABLET | Refills: 0 | OUTPATIENT
Start: 2021-03-24

## 2021-06-24 DIAGNOSIS — F41.1 GENERALIZED ANXIETY DISORDER: ICD-10-CM

## 2021-06-25 RX ORDER — ALPRAZOLAM 0.25 MG/1
0.25 TABLET ORAL 3 TIMES DAILY PRN
Qty: 90 TABLET | Refills: 0 | Status: SHIPPED | OUTPATIENT
Start: 2021-06-25

## 2021-09-21 ENCOUNTER — TELEPHONE (OUTPATIENT)
Dept: FAMILY MEDICINE CLINIC | Facility: CLINIC | Age: 26
End: 2021-09-21

## 2021-09-21 NOTE — TELEPHONE ENCOUNTER
As discussed, I agree  Patient should be seen in [facility]  or is welcome to reestablish with us here but we should see him in the office physically

## 2021-09-21 NOTE — TELEPHONE ENCOUNTER
Phone call from pt requesting a virtual visit for medication refill  Pt states that he lives in [place] and has a PCP there but they will not fill the benzodiazepines  Per Verbal from Dr Elena Marin, pt will have to been seen in office  No virtual visits  He also suggested he find a PCP where he resides at now

## 2023-01-11 NOTE — TELEPHONE ENCOUNTER
Patient has been scheduled
Patient was advise to schedule virtual appointment for congestion and thick mucus  but wanted labs to check for STD please advise
Recommend virtual visit if he is having congestion  Otherwise we can see him in person 
Adequate (%)

## 2023-02-02 ENCOUNTER — TELEPHONE (OUTPATIENT)
Dept: FAMILY MEDICINE CLINIC | Facility: CLINIC | Age: 28
End: 2023-02-02

## 2023-02-02 NOTE — TELEPHONE ENCOUNTER
Rcvd outside event notification  In Care Everywhere, looks as though pt sees a provider outside of [facility]  Based on further chart review, pt is under the care of PCP outside of [facility]   New PCP info:    Destiny Godfrey MD   [phone] (Work)  [phone] (Fax)  [address], [place] [address]  INTERNAL MEDICINE

## 2023-02-10 NOTE — TELEPHONE ENCOUNTER
02/10/23 1:07 PM        The office's request has been received, reviewed, and the patient chart updated  The PCP has successfully been removed with a patient attribution note  This message will now be completed          Thank you  Bharati Mejias

## 2025-09-18 ENCOUNTER — APPOINTMENT (OUTPATIENT)
Dept: GASTROENTEROLOGY | Facility: CLINIC | Age: 30
End: 2025-09-18
Payer: COMMERCIAL

## 2025-09-18 VITALS
HEART RATE: 67 BPM | SYSTOLIC BLOOD PRESSURE: 128 MMHG | HEIGHT: 69 IN | OXYGEN SATURATION: 97 % | WEIGHT: 170 LBS | BODY MASS INDEX: 25.18 KG/M2 | DIASTOLIC BLOOD PRESSURE: 85 MMHG

## 2025-09-18 DIAGNOSIS — K50.818 CROHN'S DISEASE OF BOTH SMALL AND LARGE INTESTINE WITH OTHER COMPLICATION: ICD-10-CM

## 2025-09-18 PROCEDURE — ZZZZZ: CPT

## 2025-09-18 RX ORDER — SODIUM PICOSULFATE, MAGNESIUM OXIDE, AND ANHYDROUS CITRIC ACID 12; 3.5; 1 G/175ML; G/175ML; MG/175ML
10-3.5-12 MG-GM LIQUID ORAL
Qty: 1 | Refills: 0 | Status: ACTIVE | COMMUNITY
Start: 2025-09-18 | End: 1900-01-01

## 2025-09-19 ENCOUNTER — NON-APPOINTMENT (OUTPATIENT)
Age: 30
End: 2025-09-19

## 2025-09-23 RX ORDER — INFLIXIMAB-DYYB 100 MG/10ML
100 INJECTION, POWDER, LYOPHILIZED, FOR SOLUTION INTRAVENOUS
Qty: 0 | Refills: 0 | Status: COMPLETED | OUTPATIENT
Start: 2025-09-18

## 2025-09-23 RX ORDER — ACETAMINOPHEN 325 MG/1
325 TABLET ORAL
Qty: 0 | Refills: 0 | Status: COMPLETED | OUTPATIENT
Start: 2025-09-18

## 2025-09-24 LAB
25(OH)D3 SERPL-MCNC: 32.4 NG/ML
ALBUMIN SERPL ELPH-MCNC: 4.1 G/DL
ALP BLD-CCNC: 73 U/L
ALT SERPL-CCNC: 21 U/L
ANION GAP SERPL CALC-SCNC: 10 MMOL/L
AST SERPL-CCNC: 25 U/L
BASOPHILS # BLD AUTO: 0.03 K/UL
BASOPHILS NFR BLD AUTO: 0.5 %
BILIRUB SERPL-MCNC: 0.6 MG/DL
BUN SERPL-MCNC: 18 MG/DL
CALCIUM SERPL-MCNC: 9.4 MG/DL
CHLORIDE SERPL-SCNC: 104 MMOL/L
CO2 SERPL-SCNC: 23 MMOL/L
CREAT SERPL-MCNC: 1.13 MG/DL
CRP SERPL-MCNC: <3 MG/L
EGFRCR SERPLBLD CKD-EPI 2021: 91 ML/MIN/1.73M2
EOSINOPHIL # BLD AUTO: 0.1 K/UL
EOSINOPHIL NFR BLD AUTO: 1.7 %
ERYTHROCYTE [SEDIMENTATION RATE] IN BLOOD BY WESTERGREN METHOD: 16 MM/HR
FERRITIN SERPL-MCNC: 73 NG/ML
GLUCOSE SERPL-MCNC: 96 MG/DL
HBV SURFACE AG SER QL: NONREACTIVE
HCT VFR BLD CALC: 42 %
HCV AB SER QL: NONREACTIVE
HCV S/CO RATIO: 0.21 S/CO
HGB BLD-MCNC: 13.7 G/DL
IMM GRANULOCYTES NFR BLD AUTO: 0.3 %
IRON SATN MFR SERPL: 31 %
IRON SERPL-MCNC: 88 UG/DL
LYMPHOCYTES # BLD AUTO: 1.54 K/UL
LYMPHOCYTES NFR BLD AUTO: 25.9 %
M TB IFN-G BLD-IMP: NEGATIVE
MAN DIFF?: NORMAL
MCHC RBC-ENTMCNC: 30.8 PG
MCHC RBC-ENTMCNC: 32.6 G/DL
MCV RBC AUTO: 94.4 FL
MONOCYTES # BLD AUTO: 0.68 K/UL
MONOCYTES NFR BLD AUTO: 11.4 %
NEUTROPHILS # BLD AUTO: 3.58 K/UL
NEUTROPHILS NFR BLD AUTO: 60.2 %
PLATELET # BLD AUTO: 284 K/UL
POTASSIUM SERPL-SCNC: 5.3 MMOL/L
PROT SERPL-MCNC: 7.6 G/DL
QUANTIFERON TB PLUS MITOGEN MINUS NIL: >10 IU/ML
QUANTIFERON TB PLUS NIL: 0.02 IU/ML
QUANTIFERON TB PLUS TB1 MINUS NIL: 0 IU/ML
QUANTIFERON TB PLUS TB2 MINUS NIL: 0 IU/ML
RBC # BLD: 4.45 M/UL
RBC # FLD: 13.2 %
SODIUM SERPL-SCNC: 138 MMOL/L
TIBC SERPL-MCNC: 281 UG/DL
UIBC SERPL-MCNC: 193 UG/DL
VIT B12 SERPL-MCNC: 1180 PG/ML
WBC # FLD AUTO: 5.95 K/UL

## (undated) DEVICE — 10FR FRAZIER SUCTION HANDLE: Brand: CARDINAL HEALTH

## (undated) DEVICE — TIBURON SPLIT SHEET: Brand: CONVERTORS

## (undated) DEVICE — GLOVE SRG BIOGEL 7

## (undated) DEVICE — SUT MONOCRYL 4-0 PS-2 27 IN Y426H

## (undated) DEVICE — ASTOUND STANDARD SURGICAL GOWN, XXL: Brand: CONVERTORS

## (undated) DEVICE — SKIN MARKER DUAL TIP WITH RULER CAP, FLEXIBLE RULER AND LABELS: Brand: DEVON

## (undated) DEVICE — ADHESIVE SKN CLSR HISTOACRYL FLEX 0.5ML LF

## (undated) DEVICE — SUT SILK 2-0 30 IN A305H

## (undated) DEVICE — GLOVE INDICATOR PI UNDERGLOVE SZ 7 BLUE

## (undated) DEVICE — TUBING SUCTION 5MM X 12 FT

## (undated) DEVICE — 2000CC GUARDIAN II: Brand: GUARDIAN

## (undated) DEVICE — PROXIMATE SKIN STAPLERS (35 WIDE) CONTAINS 35 STAINLESS STEEL STAPLES (FIXED HEAD): Brand: PROXIMATE

## (undated) DEVICE — NEEDLE 18 G X 1 1/2

## (undated) DEVICE — INTENDED FOR TISSUE SEPARATION, AND OTHER PROCEDURES THAT REQUIRE A SHARP SURGICAL BLADE TO PUNCTURE OR CUT.: Brand: BARD-PARKER SAFETY BLADES SIZE 15, STERILE

## (undated) DEVICE — SCD SEQUENTIAL COMPRESSION COMFORT SLEEVE MEDIUM KNEE LENGTH: Brand: KENDALL SCD

## (undated) DEVICE — SPONGE CHERRY 1/2IN

## (undated) DEVICE — SUT VICRYL 4-0 SH 27 IN J415H

## (undated) DEVICE — SPONGE STICK WITH PVP-I: Brand: KENDALL

## (undated) DEVICE — GLOVE SRG BIOGEL ECLIPSE 7

## (undated) DEVICE — ELECTRODE BLADE MOD E-Z CLEAN  2.75IN 7CM -0012AM

## (undated) DEVICE — GLOVE SRG BIOGEL ORTHOPEDIC 7

## (undated) DEVICE — GLOVE INDICATOR PI UNDERGLOVE SZ 7.5 BLUE

## (undated) DEVICE — GLOVE SRG BIOGEL 6.5

## (undated) DEVICE — SYRINGE 10ML LL

## (undated) DEVICE — BETHLEHEM UNIVERSAL MINOR GEN: Brand: CARDINAL HEALTH

## (undated) DEVICE — SPECIMEN CONTAINER STERILE PEEL PACK

## (undated) DEVICE — NEEDLE 25G X 1 1/2